# Patient Record
Sex: MALE | Race: WHITE
[De-identification: names, ages, dates, MRNs, and addresses within clinical notes are randomized per-mention and may not be internally consistent; named-entity substitution may affect disease eponyms.]

---

## 2018-06-13 ENCOUNTER — HOSPITAL ENCOUNTER (INPATIENT)
Dept: HOSPITAL 80 - FED | Age: 83
LOS: 5 days | Discharge: HOME | DRG: 687 | End: 2018-06-18
Attending: FAMILY MEDICINE | Admitting: FAMILY MEDICINE
Payer: COMMERCIAL

## 2018-06-13 DIAGNOSIS — E86.9: ICD-10-CM

## 2018-06-13 DIAGNOSIS — N40.1: ICD-10-CM

## 2018-06-13 DIAGNOSIS — K59.00: ICD-10-CM

## 2018-06-13 DIAGNOSIS — I10: ICD-10-CM

## 2018-06-13 DIAGNOSIS — R91.1: ICD-10-CM

## 2018-06-13 DIAGNOSIS — N17.9: ICD-10-CM

## 2018-06-13 DIAGNOSIS — K40.90: ICD-10-CM

## 2018-06-13 DIAGNOSIS — N13.39: ICD-10-CM

## 2018-06-13 DIAGNOSIS — D41.4: Primary | ICD-10-CM

## 2018-06-13 DIAGNOSIS — Z66: ICD-10-CM

## 2018-06-13 LAB — PLATELET # BLD: 166 10^3/UL (ref 150–400)

## 2018-06-13 PROCEDURE — G0103 PSA SCREENING: HCPCS

## 2018-06-13 PROCEDURE — A9503 TC99M MEDRONATE: HCPCS

## 2018-06-13 NOTE — EDPHY
H & P


Stated Complaint: BACK PAIN/POS PROSTATE INFX


Time Seen by Provider: 06/13/18 22:49


HPI/ROS: 


HPI:  This 84-year-old male who presented with





Chief Complaint:  Low back pain left lower quadrant pain





Location:  Low back, left lower quadrant


Quality:  Pain


Duration:  Several days


Signs and Symptoms: no fever, no nausea, no vomiting, no hematemesis, no blood 

in stool, no abdominal bloating, no diarrhea, no back pain, no urinary symptoms

, no testicular/groin pain, no indigestion, no chest pain, no shortness of 

breath


Timing:  Acute


Severity:  Worsening


Context:  Patient reports that he was having dysuria, urinary frequency, low 

back pain for several days and to urgent care [id number] and was diagnosed with BPH 

and acute prostatitis; he was given Flomax and Bactrim for which she has been 

taking and being compliant.  Over the last several days patient has continued 

to feel generalized weakness with fatigue.  His daughter is extremely concerned 

that he has diverticulitis and with like laboratory studies and CT abdomen and 

pelvis scan performed.  Patient reports that he has a decreased appetite.  

Denies any fever/diarrhea/nausea/vomiting. 


Modifying Factors:  Bactrim, Flomax





Comment: 








ROS: see HPI


Constitutional: No fever, no chills, no weight loss


Eyes:  No blurred vision


Respiratory:  No shortness of breath, no cough


Cardiovascular:  No chest pain, no palpitations


Gastrointestinal:  No nausea, no vomiting, no diarrhea, no hematemesis, no 

blood in stool 


Genitourinary:  No dysuria, no blood in urine 


Extremities:  No myalgias, no edema


Neurologic:  No weakness, no numbness


Skin:  No rashes, no petechiae


Hematologic:  No bruising, no bleeding








MEDICAL/SURGICAL/SOCIAL HISTORY: 


Medical history:  Hard of hearing on right; BPH. 


Surgical history:  Tonsillectomy


Social history:  Retired.  Never smoked.  Family history noncontributory.








CONSTITUTIONAL:  Polite and cooperative elderly white male, awake and alert, no 

obvious distress


HEENT: Atraumatic and normocephalic, PERRL, EOMI.  Nares patent; no rhinorrhea;

  no nasal mucosal edema. Tympanic membranes clear. Oropharynx clear, no 

exudate and moist pink mucosa.  Airway patent.  No lymphadenopathy.  No 

meningismus.


Cardiovascular: Normal S1/S2, regular rate, regular rhythm, without murmur rub 

or gallop.


PULMONARY/CHEST:  Symmetrical and nontender. Clear to auscultation bilaterally. 

Good air movement. No accessory muscle usage.


ABDOMEN:  Soft, nondistended, minimal left lower quadrant tenderness, no rebound

, no guarding, no peritoneal signs, no masses or organomegaly. No CVAT.


EXTREMITIES:  2/2 pulses, strength 5/5, no deformities, no clubbing, no 

cyanosis or edema.


NEUROLOGICAL: no focal neuro deficits.  GCS 15.


SKIN: Warm and dry, no erythema. no rash.  Good capillary refill. 








Source: Patient, Family (Daughter)


Exam Limitations: No limitations





- Personal History


Current Tetanus Diphtheria and Acellular Pertussis (TDAP): Yes


Tetanus Vaccine Date: 9/11





- Medical/Surgical History


Hx Asthma: No


Hx Chronic Respiratory Disease: No


Hx Diabetes: No


Hx Cardiac Disease: No


Hx Renal Disease: No


Hx Cirrhosis: No


Hx Alcoholism: No


Hx HIV/AIDS: No


Hx Splenectomy or Spleen Trauma: No


Other PMH: TONSIL, ADENOIDS, EYE STRAIGHTENING





- Social History


Smoking Status: Never smoked


Constitutional: 


 Initial Vital Signs











Temperature (C)  36.6 C   06/13/18 22:42


 


Heart Rate  71   06/13/18 22:42


 


Respiratory Rate  16   06/13/18 22:42


 


Blood Pressure  172/86 H  06/13/18 22:42


 


O2 Sat (%)  92   06/13/18 22:42








 











O2 Delivery Mode               Room Air














Allergies/Adverse Reactions: 


 





No Known Allergies Allergy (Verified 06/13/18 22:41)


 








Home Medications: 














 Medication  Instructions  Recorded


 


NK [No Known Home Meds]  06/13/18














Medical Decision Making


ED Course/Re-evaluation: 


Vital signs reviewed and stable.  No systemic signs.


Labs, urinalysis, CT abdomen and pelvis scan to evaluate for diverticulitis 

ordered. 


2315:  Labs reviewed.  Creatinine 2.9; BUN 42; in 2012 creatinine was around 1. 

1 L normal saline ordered. 


CT abdomen and pelvis scan changed to without contrast and differential now 

includes ureterolithiasis. 


Called by radiologist who advised that CT abdomen and pelvis scan shows 

significant bladder wall thickening concerning for malignancy as well as right 

ureteral blockage in obstructing in the collecting system


2354: ED decision to consult hospitalist for admission acute kidney injury 

secondary to obstruction verses Bactrim induced and bladder malignancy.  Spoke 

with Dr. Pepper who kindly agrees to admit patient for further care.  Urology 

consulted. 











This patient was seen under the supervision of my secondary supervising 

physician.  I evaluated care for this patient independently.  Discussed this 

patient with Dr. Moctezuma who did not see the patient.  








Differential Diagnosis: 


Back pain including but not limited to muscular pain, herniated disc, spine 

fracture, intra-abdominal causes and urinary tract infection.








- Data Points


Laboratory Results: 


 Laboratory Results





 06/13/18 23:07 





 06/13/18 23:07 





 











  06/13/18 06/13/18 06/13/18





  23:10 23:07 23:07


 


WBC      9.62 10^3/uL H 10^3/uL





     (3.80-9.50) 


 


RBC      4.53 10^6/uL 10^6/uL





     (4.40-6.38) 


 


Hgb      14.7 g/dL g/dL





     (13.7-17.5) 


 


POC Hgb  15.3 gm/dL gm/dL    





   (13.7-17.5)   


 


Hct      42.9 % %





     (40.0-51.0) 


 


POC Hct  45 % %    





   (40-51)   


 


MCV      94.7 fL fL





     (81.5-99.8) 


 


MCH      32.5 pg pg





     (27.9-34.1) 


 


MCHC      34.3 g/dL g/dL





     (32.4-36.7) 


 


RDW      13.3 % %





     (11.5-15.2) 


 


Plt Count      166 10^3/uL 10^3/uL





     (150-400) 


 


MPV      10.1 fL fL





     (8.7-11.7) 


 


Neut % (Auto)      76.8 % H %





     (39.3-74.2) 


 


Lymph % (Auto)      8.1 % L %





     (15.0-45.0) 


 


Mono % (Auto)      14.2 % H %





     (4.5-13.0) 


 


Eos % (Auto)      0.4 % L %





     (0.6-7.6) 


 


Baso % (Auto)      0.3 % %





     (0.3-1.7) 


 


Nucleat RBC Rel Count      0.0 % %





     (0.0-0.2) 


 


Absolute Neuts (auto)      7.38 10^3/uL H 10^3/uL





     (1.70-6.50) 


 


Absolute Lymphs (auto)      0.78 10^3/uL L 10^3/uL





     (1.00-3.00) 


 


Absolute Monos (auto)      1.37 10^3/uL H 10^3/uL





     (0.30-0.80) 


 


Absolute Eos (auto)      0.04 10^3/uL 10^3/uL





     (0.03-0.40) 


 


Absolute Basos (auto)      0.03 10^3/uL 10^3/uL





     (0.02-0.10) 


 


Absolute Nucleated RBC      0.00 10^3/uL 10^3/uL





     (0-0.01) 


 


Immature Gran %      0.2 % %





     (0.0-1.1) 


 


Immature Gran #      0.02 10^3/uL 10^3/uL





     (0.00-0.10) 


 


POC Sodium  138 mEq/L mEq/L    





   (135-145)   


 


Sodium    140 mEq/L mEq/L  





    (135-145)  


 


POC Potassium  4.9 mEq/L mEq/L    





   (3.3-5.0)   


 


Potassium    4.9 mEq/L mEq/L  





    (3.3-5.0)  


 


POC Chloride  112 mEq/L H mEq/L    





   ()   


 


Chloride    110 mEq/L mEq/L  





    ()  


 


Carbon Dioxide    16 mEq/l L mEq/l  





    (22-31)  


 


Anion Gap    14 mEq/L mEq/L  





    (8-16)  


 


POC BUN  45 mg/dL H mg/dL    





   (7-23)   


 


BUN    42 mg/dL H mg/dL  





    (7-23)  


 


Creatinine    2.5 mg/dL H mg/dL  





    (0.7-1.3)  


 


POC Creatinine  2.9 mg/dL H mg/dL    





   (0.7-1.3)   


 


Estimated GFR    25   





    


 


Glucose    119 mg/dL H mg/dL  





    ()  


 


POC Glucose  126 mg/dL H mg/dL    





   ()   


 


Calcium    9.0 mg/dL mg/dL  





    (8.5-10.4)  


 


Total Bilirubin    0.4 mg/dL mg/dL  





    (0.1-1.4)  


 


Conjugated Bilirubin    0.4 mg/dL mg/dL  





    (0.0-0.5)  


 


Unconjugated Bilirubin    0.0 mg/dL mg/dL  





    (0.0-1.1)  


 


AST    28 IU/L IU/L  





    (17-59)  


 


ALT    37 IU/L IU/L  





    (21-72)  


 


Alkaline Phosphatase    92 IU/L IU/L  





    ()  


 


Total Protein    6.1 g/dL L g/dL  





    (6.3-8.2)  


 


Albumin    3.0 g/dL L g/dL  





    (3.5-5.0)  


 


Lipase    47 IU/L IU/L  





    ()  











Medications Given: 


 








Discontinued Medications





Sodium Chloride (Ns)  1,000 mls @ 0 mls/hr IV EDNOW ONE; Wide Open


   PRN Reason: Protocol


   Stop: 06/13/18 23:17


   Last Admin: 06/13/18 23:16 Dose:  1,000 mls





Point of Care Test Results: 


 Chemistry











  06/13/18





  23:10


 


POC Sodium  138 mEq/L mEq/L





   (135-145) 


 


POC Potassium  4.9 mEq/L mEq/L





   (3.3-5.0) 


 


POC Chloride  112 mEq/L H mEq/L





   () 


 


POC BUN  45 mg/dL H mg/dL





   (7-23) 


 


POC Creatinine  2.9 mg/dL H mg/dL





   (0.7-1.3) 


 


POC Glucose  126 mg/dL H mg/dL





   () 








 ISTAT H&H











  06/13/18





  23:10


 


POC Hgb  15.3 gm/dL gm/dL





   (13.7-17.5) 


 


POC Hct  45 % %





   (40-51) 














Departure





- Departure


Disposition: [facility] Inpatient Acute


Clinical Impression: 


 AMANDA (acute kidney injury), Obstruction of right ureter





Bladder malignancy


Qualifiers:


 Bladder location: unspecified site Qualified Code(s): C67.9 - Malignant 

neoplasm of bladder, unspecified





Condition: Fair

## 2018-06-14 LAB — PLATELET # BLD: 153 10^3/UL (ref 150–400)

## 2018-06-14 RX ADMIN — DOCUSATE SODIUM AND SENNOSIDES SCH TAB: 50; 8.6 TABLET ORAL at 20:52

## 2018-06-14 RX ADMIN — SODIUM CHLORIDE SCH MLS: 900 INJECTION, SOLUTION INTRAVENOUS at 10:29

## 2018-06-14 RX ADMIN — DOCUSATE SODIUM AND SENNOSIDES SCH TAB: 50; 8.6 TABLET ORAL at 09:43

## 2018-06-14 RX ADMIN — HYDROCODONE BITARTRATE AND ACETAMINOPHEN PRN TAB: 5; 325 TABLET ORAL at 18:47

## 2018-06-14 RX ADMIN — TAMSULOSIN HYDROCHLORIDE SCH MG: 0.4 CAPSULE ORAL at 09:43

## 2018-06-14 RX ADMIN — TRAVOPROST SCH DROP: 0.04 SOLUTION/ DROPS OPHTHALMIC at 20:52

## 2018-06-14 RX ADMIN — HYDROCODONE BITARTRATE AND ACETAMINOPHEN PRN TAB: 5; 325 TABLET ORAL at 00:40

## 2018-06-14 RX ADMIN — HYDROCODONE BITARTRATE AND ACETAMINOPHEN PRN TAB: 5; 325 TABLET ORAL at 06:18

## 2018-06-14 NOTE — GCON
[f rep st]



                                                                    CONSULTATION





DATE OF CONSULTATION:  2018



REASON FOR CONSULT:  Renal mass, question bladder mass.



HPI:  This is a pleasant 84-year-old male, who presented with right lower back pain that has been occ
urring for the last week.  He is also reporting feeling lack of energy and dizzy.  He lives alone in 
[facility] and came down to visit his daughter, who, upon seeing how her father was feeling, recommended
 that she bring him into the emergency room.  He is also now having a new symptom of shortness of judi
ath that occurs while at rest, which he denies having had in the past.  He does have a long history o
f urinary issues for the last at least a couple of years.  He reports getting up 3 times at night wit
h a low flow of urine.  Denies a known history of prostate cancer or kidney cancer.  Did have hematur
ia once several months ago.  Initially went to [facility] on 2018, before going to the
 ER.  Was diagnosed with prostatitis and started on antibiotics.  Yet, urine culture is negative at t
hat visit.



HOME MEDICATIONS:  As per EMR.



ALLERGIES:  No known drug allergies.



PAST MEDICAL HISTORY:  Macular degeneration, BPH, hearing deficit, lower GI bleeding, diverticulosis,
 internal hemorrhoids.



SURGICAL:  Tonsillectomy, adenoidectomy, EGD and colonoscopy in 2018.



FAMILY HISTORY:  Father is  from coronary artery disease.  The patient has 4 adult children, 
including his daughter, who lives in [place], is at the bedside.



SOCIAL HISTORY:  Lives alone in [facility] [place] [facility] and is healthy.  Recently traveled to [place].  Denies smok
ing.  Occasional beer drinking.



PHYSICAL EXAM:  VITAL SIGNS:  Blood pressure is 161/73.  Heart rate is 58, respiratory rate 16, tempe
rature 36.7.  O2 is 93 on room air.  GENERAL:  This is a well-developed, well-nourished male in no ac
[place] distress.  HEENT:  Normocephalic, atraumatic.  Extraocular movements intact.  NECK:  Supple.  No 
lymphadenopathy.  CARDIAC:  Regular rate and rhythm.  No lower extremity edema.  No obvious JVD.  RES
PIRATORY:  Normal breath sounds without accessory respiratory muscle use.  GI:  Abdomen was soft, non
distended, nontender to palpation.  :  No CVA tenderness.  No bladder distention or tenderness to p
alpation.  INTEGUMENT:  No obvious rashes or lesions.  MUSCULOSKELETAL:  Patient was examined while s
upine.  NEURO:  He is alert and oriented.  Affect appropriate to situation.



LABS:  His white blood cell count is 8.86, hemoglobin 13.3, hematocrit 39.3, platelets 153.  His chem
istry:  Sodium 140, potassium 4.8, chloride 112, carbon dioxide 17, BUN 38, creatinine 2.4, glucose 8
8.  PSA pending.  Urine:  Positive for 1+ leuks only.  CAT scan of the abdomen and pelvis was reviewe
d personally by myself, along with Dr. Diamond.  Does show a question of a possible mass versus enlarge
d bladder versus fluid in the bladder, along with thickened-appearing bladder wall.  Does have multip
le nodules on the left kidney, by our read more concerning for inflammatory process on the right kidn
ey, especially without clear evidence of an infection.



ASSESSMENT/PLAN:  Back pain of unknown etiology, question bladder mass, inflammatory process of the r
ight kidney, mass of the left kidney.  After review of this complicated case with Dr. Diamond, we will 
order a CT cystogram to better assess the bladder.  MRI of the kidneys has been ordered, along with a
 PSA.  Once imaging and labs have returned, we will be better able to move forward in caring for this
 patient.





Job #:  [id number]

## 2018-06-14 NOTE — PDMN
Medical Necessity


Medical necessity: Pt meets IP criteria per MD & MCG CCC-039: Urinary 

Complications w/bladder wall mass/high-grade obstructive process suggestive of 

malignancy, worsening back pain, generalized weakness/fatigue, acute renal 

failure & dyspnea; admit for further workup, Urology consult, IV abx & IVFs

## 2018-06-14 NOTE — HOSPPROG
Hospitalist Progress Note


Assessment/Plan: 





#Bladder Mass


#Low back pain and LLQ Pain


#High grade obstruction of renal collecting system due to bladder mass


#Bilateral renal and parapelvic nodules


#RLL 2 cm nodule


#AMANDA





Plan:


IVF


Urology to see


Additional imaging per Urology


Would like to order CT chest, abd/pelvis with IV contrast once Cr. is improving


Stop Rocephin


cont inpatient


Objective: 


 Vital Signs











Temp Pulse Resp BP Pulse Ox


 


 36.7 C   58 L  16   161/73 H  93 


 


 06/14/18 12:42  06/14/18 12:42  06/14/18 12:42  06/14/18 12:56  06/14/18 12:42








 Laboratory Results





 06/14/18 06:10 





 06/14/18 06:10 





 











 06/13/18 06/14/18 06/15/18





 05:59 05:59 05:59


 


Intake Total   1000


 


Output Total   150


 


Balance   850














ICD10 Worksheet


Patient Problems: 


 Problems











Problem Status Onset


 


AMANDA (acute kidney injury) Acute  


 


Bladder malignancy Acute  


 


Obstruction of right ureter Acute  


 


Bleeding Active

## 2018-06-14 NOTE — ASMTCMCOM
CM Note

 

CM Note                       

Notes:

Chart reviewed. Patient is an 84 year old male from [facility]. He was brought in by his 

daughter Bethany when she became concerned about his having difficulty with breathing after having 

been diagnosed with prostatitis and been taking antibiotics He normally spends most of his time in 

[place] and is typically very active riding his bike to the store and into [place]. She shares 

with me she hopes to get him to come down to her home in [place] more often He lost his wife Barbie 

about 17 years ago and most recently his  moved to [place] to be closer to her 

family. He is awaiting testing at this time. Needs TBD.



Plan: TBD

 

Date Signed:  06/14/2018 02:11 PM

Electronically Signed By:Porsha [place] RN

## 2018-06-14 NOTE — GHP
[f 
rep st]



                                                            HISTORY AND PHYSICAL





DATE OF ADMISSION:  2018



SOURCE OF DATA:  Patient provides history, appears reliable.  EMR was reviewed 
and case discussed with ED provider.



PRIMARY CARE PHYSICIAN:  Unlisted.



CHIEF COMPLAINT:  Low back pain and left lower quadrant abdominal pain.



HISTORY OF PRESENT ILLNESS:  This is a very pleasant 84-year-old gentleman with 
past medical history significant for BPH, macular degeneration, hearing deficit
, and history of diverticulosis, who presents to the emergency department today 
with complaints of worsening low back pain and left lower quadrant abdominal 
pain for several days.  The patient reports that his low back pain actually 
started weeks to months ago.  He had an episode of hematuria at 1 point, which 
did clear, but this was several months ago.  He has a history of urinary 
frequency, nocturia and difficulty initiating urinary stream.  He has not had a 
formal diagnosis of BPH.  However, he did present to Urgent Care a few days ago 
and was diagnosed with prostatitis and started on Flomax and Bactrim.  The 
patient since that time has developed worsening generalized weakness and 
increasing fatigue, decreased appetite.  He denies any fevers, chills, nausea, 
vomiting, or diarrhea.  He has not had any melena or hematochezia.  No known 
sick contacts.



REVIEW OF SYSTEMS:  GENERAL:  Negative for fevers, chills.  SKIN:  No acute 
rashes, sores.  ENT:  Patient reports some disequilibrium with ambulation 
occasionally related to his progressively declining hearing, right worse than 
left ear.  He denies any rhinorrhea or sore throat.  EYES:  Patient wears 
glasses.  No acute vision changes, but has progressively had decline related to 
his macular degeneration.  CV:  No chest pain, palpitations.  RESPIRATORY:  
Patient does report that he has been having increasing shortness of breath with 
exertion in the last several weeks.  He denies any cough.  He also notes that 
he has had some issues with adjustment when he would go back home to [place] related to altitude at 8700 feet.  However, in the last several weeks, he 
reports this is more unusual than normal.  He denies any lower extremity 
swelling or edema.  No calf pain or tenderness.  GI:  See HPI.  :  The 
patient denies any dysuria, but he has had plenty of frequency with decreased 
stream and difficulties initiating a stream.  He has had a singular episode of 
hematuria that resolved several months ago.  No tobacco history. 

MUSCULOSKELETAL:  The patient denies any acute joint pain or myalgias.  NEURO:  
Patient does report occasional pressure-type headaches.  No numbness, tingling, 
or focal deficits.  PSYCH:  Negative for anxiety, depression. Remainder ROS 
negative except as noted above.



ALLERGIES:  No known drug allergies.



HOME MEDICATIONS:  As per EMR.  Just recently patient was started on Bactrim 
and Flomax.  Otherwise, none listed.



PAST MEDICAL HISTORY:  Significant for macular degeneration, BPH, hearing 
deficit, history of lower GI bleeding diagnosed with diverticulosis, internal 
hemorrhoids.



PAST SURGICAL HISTORY:  Significant for tonsillectomy, adenoidectomy, EGD and 
colonoscopy in 2018.



FAMILY HISTORY:  Father , related to CAD.  Mother passed away at old 
age of 98.  The patient has 4 adult children who are mostly healthy.  He has 1 
son with a history of prostate cancer.  No colon cancer runs in the family.



SOCIAL HISTORY:  Patient lives alone currently.  He is .  He previously 
had a  living with him, but she has since moved out of state.  He has 
good support from his children in town.  He does not smoke.  He drinks an 
occasional beer.  Nothing on a daily basis and does not use any illicit drugs 
or marijuana.



CODE STATUS:  Patient with advanced directives and is a DNR DNI.



PHYSICAL EXAM:  VITAL SIGNS:  Blood pressure 172/86, heart rate 71, respiratory 
rate 16, O2 saturation 92% on room air with temperature 36.6.  GENERAL:  No 
acute distress.  Pleasant, elderly adult gentleman is resting quietly in bed.  
He wakes easily to his name.  Pleasant and cooperative.  HEAD:  Normocephalic, 
atraumatic.  EYES:  Extraocular muscles grossly intact.  Pupils equal, round, 
decreased reactive to light bilaterally, but symmetric.  No scleral icterus or 
conjunctival injection.  ENT:  Mucous membranes appear slightly dry.  No 
oropharyngeal erythema or exudates.  Dentition is fair condition.  NECK:  
Supple.  Trachea midline.  CV:  Regular rate and rhythm.  No murmurs, rubs, or 
gallops appreciated.  RESPIRATORY:  Unlabored breathing.  Lungs are clear to 
auscultation bilaterally, but he is a bit diminished bibasilarly, possibly a 
few crackles in the left base compared to the right.  Otherwise, unlabored 
breathing.  ABDOMEN:  Positive bowel sounds.  Soft, nontender to palpation.  No 
rebound, guarding, or masses appreciated.  :  No Maxwell catheter in place.  No 
suprapubic tenderness to palpation.  EXTREMITIES:  2+ pedal pulses bilaterally 
and symmetric. No edema.  Patient sits up independently.  Moves all 
extremities.  Strength grossly intact. 5/5 upper and lower extremities.  NEURO:
  Grossly nonfocal.  No facial drooping.  Moves all extremities as noted above.
  PSYCH:  Thought process, content and questions are all appropriate.  Patient 
awake, alert, and oriented x3.



LABORATORY STUDIES:  

1.  WBC is 9.62, H and H is 14.7, 42.9, MCV of 94.7, platelet count is 166, 
neutrophil percent 76.8, no bands.

2.  Sodium is 140, potassium 4.9, chloride 110, CO2 16, BUN is 42, creatinine 
is 2.5, GFR estimate is 25, glucose 119, calcium is 9.2.  Total protein 6.1, 
albumin is 3.0, total bilirubin 0.4, ALT is 37, AST 28, alkaline phosphatase 92
, lipase is 47.  UA:  Specific gravity 1.019, pH of 5.0, 2+ protein, 1+ leuk 
esterase.  RBCs and WBCs are pending.  Glucose negative.

3.  CT abdomen and pelvis:  Image report was reviewed myself, is noncontrast 
study.  Visualized lung bases show no discrete consolidation or effusion.  
Pulmonary nodule, normal size liver with smooth surface contour, 6 mm 
hypoattenuating nodule within the inferior left hepatic lobe, probably a simple 
cyst.  Gallbladder decompressed.  No ductal dilation.  There is a high-grade 
obstruction of the right renal collecting system with inflammatory stranding 
throughout the renal hilum.  Obstruction likely related to irregular masslike 
thickening of the right bladder wall.  There is also pathologic enlargement of 
the adjacent lymph nodes in the internal iliac chain, representative of large 
lymph node measuring up to 1.9 cm.  Numerous bilateral renal cortical nodules 
suggestive of simple and hypoattenuated cysts, limited secondary to noncontrast 
study.  Hiatal hernia present is small.  Small bowel is nondilated, non 
thickened, large right inguinal hernia contains nonobstructed loops of distal 
small bowel.  There is dilation of the cecum with retained stool and gas.  
Extensive diverticulosis is noted.  Prostate enlarged with dystrophic internal 
calcifications.  No intraabdominal free fluid or free air.  Multiple level 
degenerative changes in the lumbar spine are present.

4.  Chest x-ray ordered.  Image report reviewed myself.  Appears to be some 
linear atelectasis, upper lobe haziness, perihilar prominence is present.



ASSESSMENT AND PLAN:  This is a pleasant 84-year-old gentleman who presents 
with complaints of low back pain and lower abdominal pain.

1.  Abdominal pain.  CT does not quite explain the patient's complaint of right-
sided abdominal pain.  He does have a high-grade obstructive process on the 
left renal collecting system with findings suggestive of malignancy.  He also 
has a right-sided inguinal hernia which is relatively asymptomatic.  At this 
time, patient reports that his pain is currently well controlled.  CT findings 
were discussed with the patient previously by the ED provider.  I did review 
again with the patient the findings.  Plan for urology consultation has been 
called from the emergency department to see the patient tomorrow.  The patient'
s UA is still pending at this time.  Given patient's high-grade obstructive 
process, we will plan to add on dosing of Rocephin.  He has been on Bactrim.  
However, given patient's acute renal insufficiency, we will discontinue.  We 
will plan to continue patient's Flomax.

2.  Bladder wall mass.  Plan as above.  

3.  Acute renal failure.  Patient's most recent renal function dates back to 
 and was 1.08. Likely some component of obstructive nephropathy given 
findings of bladder wall mass and high-grade obstruction on the right as well 
as patient's history of benign prostatic hypertrophy.  Continue with some 
gentle IV fluid hydration.  Continue with Flomax.  Neurology consultation as 
noted above.  Repeat BMP in the morning.

4.  Dyspnea.  Chest x-ray obtained.  No evidence of acute consolidations.  
There are some chronic appearing findings in the upper lobe.  Given patient's 
new finding of bladder wall mass at increased risk for pulmonary embolus, 
patient not currently tachycardic or hypoxic.  Consider additional imaging if 
patient's renal function improves.  Hold off on anticoagulation at this time 
pending Urology evaluation.

5.  Benign prostatic hypertrophy.  Continue Flomax.  Monitor for urinary 
retention.

6.  Hypoalbuminemia, likely declined in setting of newfound findings of bladder 
wall mass and acute process.

7.  Constipation.  Bowel protocol will be ordered.  

8.  Fluid, electrolyte and nutrition, IV fluids overnight for hydration. Will 
make patient be n.p.o., electrolyte monitoring, replacement if needed.

9.  Prophylaxis.  Sequential compression devices, holding anticoagulation 
pending urologic evaluation.

10.  Code status is DNR DNI.  Patient reports that he has advanced directives 
in place.

11.  Disposition.  The patient has been admitted to inpatient status.  
Anticipate that the patient will be here greater than 2 midnights given the 
complexity of his findings and need for additional workup.





Job #:  [id number]

MTDD

## 2018-06-15 LAB
CK SERPL-CCNC: 25 IU/L (ref 0–224)
PLATELET # BLD: 162 10^3/UL (ref 150–400)

## 2018-06-15 RX ADMIN — DOCUSATE SODIUM AND SENNOSIDES SCH: 50; 8.6 TABLET ORAL at 21:22

## 2018-06-15 RX ADMIN — HYDROCODONE BITARTRATE AND ACETAMINOPHEN PRN TAB: 5; 325 TABLET ORAL at 01:06

## 2018-06-15 RX ADMIN — HYDROCODONE BITARTRATE AND ACETAMINOPHEN PRN TAB: 5; 325 TABLET ORAL at 09:15

## 2018-06-15 RX ADMIN — TAMSULOSIN HYDROCHLORIDE SCH MG: 0.4 CAPSULE ORAL at 09:15

## 2018-06-15 RX ADMIN — TRAVOPROST SCH DROP: 0.04 SOLUTION/ DROPS OPHTHALMIC at 21:36

## 2018-06-15 RX ADMIN — SODIUM CHLORIDE SCH MLS: 900 INJECTION, SOLUTION INTRAVENOUS at 10:02

## 2018-06-15 RX ADMIN — DOCUSATE SODIUM AND SENNOSIDES SCH TAB: 50; 8.6 TABLET ORAL at 09:15

## 2018-06-15 NOTE — ASMTCMCOM
CM Note

 

CM Note                       

Notes:

Chart reviewed. Still awaiting diagnostic results. Dr. Diamond from urology in to see the 

patient. The patient and his daughter have ambulated in the halls. His gait is steady, No therapies 


currently ordered. CM to follow as needs are to be determined.



Plan: TBD

 

Date Signed:  06/15/2018 01:03 PM

Electronically Signed By:Porsha Montana RN

## 2018-06-15 NOTE — SOAPPROG
SOAP Progress Note


Assessment/Plan: 


Assessment: Bladder malignancy   Acute  CT cystogram concerning for prostate 

cancer and possible TCC of the bladder, Await PSA prior to further assessment


   


                   Obstruction of right ureter   Acute no dilation of ureter 

and good parenchyma, wait on PSA prior to further recommendations











Plan: as noted





06/15/18 11:27





Subjective: 





stable


Objective: 





 Vital Signs











Temp Pulse Resp BP Pulse Ox


 


 36.9 C   66   18   168/81 H  92 


 


 06/15/18 08:26  06/15/18 10:43  06/15/18 09:19  06/15/18 10:43  06/15/18 10:43








 Laboratory Results





 06/15/18 04:26 





 06/15/18 04:26 





 











 06/14/18 06/15/18 06/16/18





 05:59 05:59 05:59


 


Intake Total  2422 


 


Output Total  1275 


 


Balance  1147 














Physical Exam





- Physical Exam


General Appearance: no apparent distress





ICD10 Worksheet


Patient Problems: 


 Problems











Problem Status Onset


 


AMANDA (acute kidney injury) Acute  


 


Bladder malignancy Acute  


 


Obstruction of right ureter Acute  


 


Bleeding Active

## 2018-06-15 NOTE — HOSPPROG
Hospitalist Progress Note


Assessment/Plan: 





#Bladder Mass


#Low back pain and LLQ Pain


#High grade obstruction of renal collecting system due to bladder mass


#Bilateral renal and parapelvic nodules


#RLL 2 cm nodule


#AMANDA, vs acute on chronic, vs chronic


#constipation


-KUB reviewed





Plan:


IVF- cont


urine studies


no abx


cont angel


await PSA


Urology following


Would like to order CT chest, abd/pelvis with IV contrast once Cr. is improving


cont inpatient


Subjective: abd distention. some SOB


Objective: 


 Vital Signs











Temp Pulse Resp BP Pulse Ox


 


 36.9 C   61   16   170/78 H  91 L


 


 06/15/18 12:43  06/15/18 12:43  06/15/18 12:43  06/15/18 12:43  06/15/18 12:43








 Laboratory Results





 06/15/18 04:26 





 06/15/18 13:38 





 











 06/14/18 06/15/18 06/16/18





 05:59 05:59 05:59


 


Intake Total  2422 


 


Output Total  1275 


 


Balance  1147 














- Physical Exam


Constitutional: no apparent distress, not in pain


Eyes: PERRL, EOMI


Ears, Nose, Mouth, Throat: moist mucous membranes, hearing normal, ears appear 

normal


Cardiovascular: regular rate and rhythym, No edema


Respiratory: no respiratory distress, no rales or rhonchi


Gastrointestinal: distension


Skin: warm


Musculoskeletal: No generalized weakness


Neurologic: AAOx3


Psychiatric: interacting appropriately, not anxious


Lymph, Heme, Immunologic: no cervical LAD, No petechiae





ICD10 Worksheet


Patient Problems: 


 Problems











Problem Status Onset


 


AMANDA (acute kidney injury) Acute  


 


Bladder malignancy Acute  


 


Obstruction of right ureter Acute  


 


Bleeding Active

## 2018-06-15 NOTE — GCON
[f rep st]



                                                                    CONSULTATION





ONCOLOGY CONSULT



REASON FOR CONSULTATION:  Bladder mass.



HISTORY OF PRESENT ILLNESS:  Much of the history was obtained from both the patient, the chart, and humza macedo his daughter, Bethany, who is very up to date about his health care.  He reports that he has been h
aving increasing trouble with urination.  He has also had some abnormal discomfort and sensation for 
the last 3 or 4 months down in his pelvis and genital area.  After he went to the bathroom a couple d
ays ago, he did have some severe pain, but then it seemed to settle down with some Advil.  The next d
ay, the pain kept coming back, and he saw his PCP.  I believe the UA at that time was unremarkable.  
On Monday, his daughter notes that he seemed more fatigued, and with the urinary problem she took him
 to Urgent Care.  They gave him antibiotics and Flomax for possible prostatitis.  Since that time, ivone preston generalized weakness and fatigue worsened, and he has had decreased appetite.  He has had some mild
 dyspnea with exertion.  He denied any bleeding or significant bruising.  Denied any fever or chills.
  He was brought to the emergency room for the persistent low-back pain and lower quadrant abdominal 
pain where a CT scan revealed a possible high-grade obstruction of the right renal collecting system 
with stranding in the hilum, and an irregular mass-like thickening in the right bladder wall, and deb
e enlargement in the surrounding lymph nodes, and enlarged prostate.  He has been seen by Urology, bu
t has not had a biopsy yet.  I reviewed the films with Dr. Diamond.



ALLERGIES:  He has no known drug allergies.



HOME MEDICATIONS:  Include eye drops; travoprost.  He also takes ibuprofen.  He was recently on Bactr
im for the prostatitis.  Tamsulosin and multivitamin.



PAST MEDICAL HISTORY:  Chronic illnesses really minimal.  He has had BPH, macular degeneration with t
rouble with his vision, chronic hearing loss, and diverticulosis.



SURGICAL HISTORY:  Includes eye surgery and tonsillectomy as a child.



FAMILY HISTORY:  Mother  age 98.  Father  of heart disease.  No history of cancer in either o
f them.  He had a sister with a history of breast cancer, but no other cancer in siblings.  He has 4 
children; 2 brothers, one recently diagnosed with prostate cancer, and 1 daughter.



SOCIAL HISTORY:  He is a .  He currently lives alone, but he has local children who support hi
m.  Does not smoke.  He drinks only an occasional beer and no drug use.  He typically is very active.




REVIEW OF SYSTEMS:  10-point review of systems performed.  Pertinent positives as per HPI, otherwise 
negative.



PHYSICAL EXAM:  VITAL SIGNS:  Temperature is 36.9, pulse is 61, blood pressure is 170/78.  General:  
He is an elderly man in no distress.  HEENT:  He has poor vision.  Sclerae nonicteric.  Oral mucosa i
s unremarkable.  He has decreased hearing, but not wearing his hearing aids right now.  LUNGS:  Clear
.  CARDIAC:  Regular.  ABDOMEN:  Soft, mildly distended.  Bowel sounds are present.  EXTREMITIES:  Un
remarkable. NEUROLOGICAL:  Appears to be grossly intact.  He does have a Maxwell catheter in, and he ha
s had 1100 mL out recently of urine.



LABS:  On arrival, white count is 9600, hemoglobin is normal, platelet count is normal.  Today, the C
BC is similar.  CMP:  On arrival, his BUN and creatinine are 42 and 2.5.  His other chemistries and L
FTs are unremarkable.  Albumin is down to 3.  Today, his BUN and creatinine are 32 and 2.0.  Potassiu
m is up a little bit, 5.3.  PSA is 3.08.



IMAGING:  Chest x-ray showed mild bibasilar atelectasis, some peribronchial wall thickening.  X-ray o
f the abdomen showed constipation.  CT of the abdomen and pelvis showed high-grade obstruction in the
 right renal collecting system; bilateral renal cortical and parapelvic nodules, most likely cysts; e
xtensive diverticulosis; large right internal hernia.  He has a bladder wall mass on the right side p
robably involving the distal right ureter with associated pelvic lymphadenopathy.  He also has an enl
arged prostate.  There is a small 6 mm nodule in the left lobe, probably a cyst.  Chest x-ray at the 
time showed a right lower lobe 2 cm rounded pneumonitis or pulmonary nodule.  Abdominal MRI showed ri
ght nephromegaly with right renal hilar inflammation and moderate-to-severe right hydronephrosis, lik
ely reflecting calyceal rupture in a patient with a bladder mass obstructing the right ureterovesical
 junction.  CT pelvis with cystogram showed right-sided bladder mass obstructing the ureterovesical j
unction and contiguous with an enlarged nodular prostate.  Could be an extension of prostate carcinom
a versus a bladder carcinoma, enlarged right pelvic lymph node.



IMPRESSION:  

1.  Enlarged prostate.

2.  Probable right bladder mass, unclear etiology.

3.  Right hydronephrosis.

4.  Vague constitutional symptoms.



PLAN:  At this point, with a normal PSA, locally invasive prostate cancer seems unlikely.  He still c
ould have BPH.  I spoke to Dr. Diamond, and his approach was going to be dependent upon that PSA, and I
 will defer to him how best to evaluate the bladder mass.  With it obstructing the right kidney and w
ith an associated lymph node, I am concerned about a localized malignancy.  If it turns out to be haley
dder cancer spread to the lymph nodes, he would not be a great candidate for standard chemotherapy, a
lthough he could probably tolerate immuno-oncology therapy.  At this point, main recommendation is ma
ybe check a sed rate to make sure he does not have evidence of PMR as well as a CPK to make sure he d
oes not have any signs of myositis, perhaps also having an aldolase.  In addition, since there is a q
uestion in his lung, we can get a CT without contrast.  We will follow along with you while in the 
spital, but defer further workup to Urology.





Job #:  [id number]

## 2018-06-16 RX ADMIN — HYDROCODONE BITARTRATE AND ACETAMINOPHEN PRN TAB: 5; 325 TABLET ORAL at 18:41

## 2018-06-16 RX ADMIN — ACETAMINOPHEN PRN MG: 325 TABLET ORAL at 14:09

## 2018-06-16 RX ADMIN — ACETAMINOPHEN PRN MG: 325 TABLET ORAL at 23:08

## 2018-06-16 RX ADMIN — HYDROCODONE BITARTRATE AND ACETAMINOPHEN PRN TAB: 5; 325 TABLET ORAL at 05:57

## 2018-06-16 RX ADMIN — DOCUSATE SODIUM AND SENNOSIDES SCH TAB: 50; 8.6 TABLET ORAL at 21:08

## 2018-06-16 RX ADMIN — TAMSULOSIN HYDROCHLORIDE SCH MG: 0.4 CAPSULE ORAL at 09:57

## 2018-06-16 RX ADMIN — DOCUSATE SODIUM AND SENNOSIDES SCH TAB: 50; 8.6 TABLET ORAL at 21:04

## 2018-06-16 RX ADMIN — TRAVOPROST SCH DROP: 0.04 SOLUTION/ DROPS OPHTHALMIC at 21:03

## 2018-06-16 RX ADMIN — SODIUM CHLORIDE SCH MLS: 900 INJECTION, SOLUTION INTRAVENOUS at 21:03

## 2018-06-16 RX ADMIN — SODIUM CHLORIDE SCH MLS: 900 INJECTION, SOLUTION INTRAVENOUS at 05:05

## 2018-06-16 RX ADMIN — DOCUSATE SODIUM AND SENNOSIDES SCH: 50; 8.6 TABLET ORAL at 09:57

## 2018-06-16 RX ADMIN — HYDROCODONE BITARTRATE AND ACETAMINOPHEN PRN TAB: 5; 325 TABLET ORAL at 21:08

## 2018-06-16 NOTE — SOAPPROG
SOAP Progress Note


Assessment/Plan: 


Assessment:


1. Bladder mass


2. R ureteral obstruction / hydronephrosis due to #1


3. ?calyceal rupture


4. Renal failure, likely due to obstruction, improving





Given that PSA is normal, likely dx is bladder cancer.  Neuroendocrine variant 

of prostate cancer or other malignancy cannot be excluded. 














Plan:


- d/w Dr. Meyers of urology. Pt ultimately needs a biopsy for diagnosis, but 

may not happen on the weekend. 


- ?ureteral stent or nephrostomy tube to treat R hydronephrosis? will defer to 

urology re: optimal management. Cr is improving


- continue gentle IV fluids


- further oncologic care to be determined by the diagnosis. evidence of 

regional adenopathy but no distant mets. will need bone scan at some point. 





35 min spent w/ pt and in coordination of care. 





 


06/16/18 09:05





Subjective: 





feels well. 


Objective: 


exam:


NAD


Lungs CTAB


CV RRR no MGR


Abd: +BS. distended. angel in place


Ext: 1+ edema


Neuro: a+ox3


 Vital Signs











Temp Pulse Resp BP Pulse Ox


 


 36.7 C   67   17   154/85 H  92 


 


 06/16/18 08:38  06/16/18 08:38  06/16/18 08:38  06/16/18 08:38  06/16/18 08:38








 Laboratory Results





 06/15/18 04:26 





 06/16/18 05:12 





 











 06/15/18 06/16/18 06/17/18





 05:59 05:59 05:59


 


Intake Total 2422  


 


Output Total 1275 1400 


 


Balance 1147 -1400 














ICD10 Worksheet


Patient Problems: 


 Problems











Problem Status Onset


 


AMANDA (acute kidney injury) Acute  


 


Bladder malignancy Acute  


 


Obstruction of right ureter Acute  


 


Bleeding Active

## 2018-06-16 NOTE — HOSPPROG
Hospitalist Progress Note


Assessment/Plan: 





#Bladder Mass causing right sided hydronephrosis


-PSA not elevated, making the diagnosis of Bladder cancer more probable 


#Low back pain and LLQ Pain


#High grade obstruction of renal collecting system due to bladder mass


#Bilateral renal and parapelvic nodules


#RLL 2 cm nodule


-no e/o of metastasis on CT w/o IV contrast


#AMANDA, vs acute on chronic, vs chronic


-Due to obstruction


-Improving


#constipation and abdominal distention


-KUB reviewed





Plan:


-IVF


-Urology following, will await surgical recc, biopsy reccs for diagnosis


-no abx


-cont angel


-CLD (no e/o of obstruction on imaging, but distended abd and decreased BS). 


-Avoid narcotics if possible as likely contributing to decreased gut motility








Subjective: no cp or sob. still with abd distention but improving. had large BM 

last night.


Objective: 


 Vital Signs











Temp Pulse Resp BP Pulse Ox


 


 36.7 C   67   17   154/85 H  94 


 


 06/16/18 08:38  06/16/18 08:38  06/16/18 08:38  06/16/18 08:38  06/16/18 10:31








 Laboratory Results





 06/15/18 04:26 





 06/16/18 05:12 





 











 06/15/18 06/16/18 06/17/18





 05:59 05:59 05:59


 


Intake Total 2422  


 


Output Total 1275 1400 


 


Balance 1147 -1400 














- Physical Exam


Constitutional: no apparent distress


Eyes: PERRL, EOMI


Ears, Nose, Mouth, Throat: moist mucous membranes


Cardiovascular: regular rate and rhythym


Respiratory: no respiratory distress


Gastrointestinal: distension, No normoactive bowel sounds


Skin: warm


Musculoskeletal: generalized weakness


Neurologic: AAOx3


Psychiatric: interacting appropriately, not anxious


Lymph, Heme, Immunologic: No petechiae





ICD10 Worksheet


Patient Problems: 


 Problems











Problem Status Onset


 


AMANDA (acute kidney injury) Acute  


 


Bladder malignancy Acute  


 


Obstruction of right ureter Acute  


 


Bleeding Active

## 2018-06-16 NOTE — SOAPPROG
SOAP Progress Note


Assessment/Plan: 


Assessment: Bladder malignancy   Acute  CT cystogram concerning for prostate 

cancer and possible TCC of the bladder, normal PSA  and BPH is considered in 

his LUT assessment, discussed and consider TURBT with understanding this may 

not be successful based on mass effect


   


                   Obstruction of right ureter   Acute no dilation of ureter 

and good parenchyma, discussed options of PCN, attempt to place stent or 

retrograde yet doubtful can be done











Plan: as noted











06/16/18 12:20





Subjective: 





improving


Objective: 





 Vital Signs











Temp Pulse Resp BP Pulse Ox


 


 36.7 C   67   17   154/85 H  94 


 


 06/16/18 08:38  06/16/18 08:38  06/16/18 08:38  06/16/18 08:38  06/16/18 10:31








 Laboratory Results





 06/15/18 04:26 





 06/16/18 05:12 





 











 06/15/18 06/16/18 06/17/18





 05:59 05:59 05:59


 


Intake Total 2422  


 


Output Total 1275 1400 


 


Balance 1147 -1400 














Physical Exam





- Physical Exam


General Appearance: alert


Respiratory: No respiratory distress


Abdomen: soft


Neuro/Psych: alert, oriented x 3





ICD10 Worksheet


Patient Problems: 


 Problems











Problem Status Onset


 


AMANDA (acute kidney injury) Acute  


 


Bladder malignancy Acute  


 


Obstruction of right ureter Acute  


 


Bleeding Active

## 2018-06-17 RX ADMIN — DOCUSATE SODIUM AND SENNOSIDES SCH TAB: 50; 8.6 TABLET ORAL at 21:07

## 2018-06-17 RX ADMIN — DOCUSATE SODIUM AND SENNOSIDES SCH TAB: 50; 8.6 TABLET ORAL at 08:54

## 2018-06-17 RX ADMIN — HYDROCODONE BITARTRATE AND ACETAMINOPHEN PRN TAB: 5; 325 TABLET ORAL at 01:56

## 2018-06-17 RX ADMIN — TAMSULOSIN HYDROCHLORIDE SCH MG: 0.4 CAPSULE ORAL at 08:54

## 2018-06-17 RX ADMIN — ACETAMINOPHEN PRN MG: 325 TABLET ORAL at 08:54

## 2018-06-17 RX ADMIN — HYDROCODONE BITARTRATE AND ACETAMINOPHEN PRN TAB: 5; 325 TABLET ORAL at 21:07

## 2018-06-17 RX ADMIN — TRAVOPROST SCH DROP: 0.04 SOLUTION/ DROPS OPHTHALMIC at 21:06

## 2018-06-17 NOTE — ASMTCMCOM
CM Note

 

CM Note                       

Notes:

Chart reviewed met with patient's daughter Bethany [phone]. She would like to have home health 

for angel management and to monitor B/P. Per therapy, no needs at this time. They have no 

preference for C . Will refer to [facility]. Patient to see Dr. Diamond for follow up of bladder 

cancer. CM to follow.



Plan : home to daughter's house in [place].

 

Date Signed:  06/17/2018 11:44 AM

Electronically Signed By:Porsha Montana RN

## 2018-06-17 NOTE — SOAPPROG
SOAP Progress Note


Assessment/Plan: 


Assessment: Bladder malignancy   Acute  CT cystogram concerning for prostate 

cancer and possible TCC of the bladder, normal PSA  and BPH is considered in 

his LUT assessment, discussed and consider TURBT with understanding this may 

not be successful based on mass effect


   


                   Obstruction of right ureter   Acute no dilation of ureter 

and good parenchyma, discussed options of PCN, attempt to place stent or 

retrograde yet doubtful can be done, consider contrast study if creat normalizes











Plan: as noted











06/17/18 09:29





Subjective: 





ambulating to BR, abdominal and flank pain improved


Objective: 





 Vital Signs











Temp Pulse Resp BP Pulse Ox


 


 36.7 C   70   15   169/80 H  92 


 


 06/17/18 08:39  06/17/18 08:39  06/17/18 08:39  06/17/18 08:39  06/17/18 08:39








 Laboratory Results





 06/15/18 04:26 





 06/17/18 04:30 





 











 06/16/18 06/17/18 06/18/18





 05:59 05:59 05:59


 


Intake Total  2159 


 


Output Total 1400 800 


 


Balance -1400 1359 














Physical Exam





- Physical Exam


General Appearance: alert


Respiratory: No respiratory distress


Abdomen: non-tender


Skin: warm/dry


Neuro/Psych: alert





ICD10 Worksheet


Patient Problems: 


 Problems











Problem Status Onset


 


AMANDA (acute kidney injury) Acute  


 


Bladder malignancy Acute  


 


Obstruction of right ureter Acute  


 


Bleeding Active

## 2018-06-17 NOTE — SOAPPROG
SOAP Progress Note


Assessment/Plan: 


Assessment:


1. Bladder mass


2. R ureteral obstruction / hydronephrosis due to #1


3. ?calyceal rupture


4. Renal failure, likely due to obstruction, improving





Given that PSA is normal, likely dx is bladder cancer.  Neuroendocrine variant 

of prostate cancer or other malignancy cannot be excluded. 














Plan:


- d/w dr lopez.  plan is for outpatient cystoscopy with biopsy + TURBT. 


- creatinine improving - no percutaneous nephrostomy for now. 


- pt to f/u with Dr. Crum in outpatient setting after biopsy.


- will order bone scan to complete staging.





25 min spent w/ pt and in coordination of care. 


06/17/18 11:19





Subjective: 





feeling a bit stronger today. 


Objective: 


exam unchanged


 Vital Signs











Temp Pulse Resp BP Pulse Ox


 


 36.7 C   70   15   169/80 H  92 


 


 06/17/18 08:39  06/17/18 08:39  06/17/18 08:39  06/17/18 10:39  06/17/18 08:39








 Laboratory Results





 06/15/18 04:26 





 06/17/18 04:30 





 











 06/16/18 06/17/18 06/18/18





 05:59 05:59 05:59


 


Intake Total  2159 


 


Output Total 1400 800 


 


Balance -1400 1359 














ICD10 Worksheet


Patient Problems: 


 Problems











Problem Status Onset


 


AMANDA (acute kidney injury) Acute  


 


Bladder malignancy Acute  


 


Obstruction of right ureter Acute  


 


Bleeding Active

## 2018-06-17 NOTE — HOSPPROG
Hospitalist Progress Note


Assessment/Plan: 





#Bladder Mass causing right sided hydronephrosis


-PSA not elevated, making the diagnosis of Bladder cancer more probable


-Cr is improving


-Angel is in 


#Low back pain and LLQ Pain


#High grade obstruction of renal collecting system due to bladder mass


#Bilateral renal and parapelvic nodules


#RLL 2 cm nodule


-no e/o of metastasis on CT w/o IV contrast


#AMANDA, vs acute on chronic, vs chronic


-Due to obstruction


-Improving


#constipation and abdominal distention


-s/p BM's





Plan:


-I spoke with Dr. Diamond today. He is not planning on any urological 

interventions at this time. He would like to see the patient f/u with him this 

week in clinic. The angel is to remain in. Cr is improving. As an outpatient, 

Dr. Diamond will consider urological options to include TURBT and Nephrostomy tube


-As for ONC diagnosis, this is pending follow up


-Advance Diet today, abd is less distended. He has had a bowel movement


-Avoid narcotics if possible as likely contributing to decreased gut motility


-Likely discharge tomorrow upon repeat labs and tolerating PO


-Amlodipine will be started for HTN


-PLan is for him to live with his daughter until he is strong enough to return 

living on his own








Subjective: no cp or sob. still with low back pain, but improving. Able to 

ambulate. no cp or sob. Angel is draining well.


Objective: 


 Vital Signs











Temp Pulse Resp BP Pulse Ox


 


 36.7 C   70   15   169/80 H  92 


 


 06/17/18 08:39  06/17/18 08:39  06/17/18 08:39  06/17/18 08:39  06/17/18 08:39








 Laboratory Results





 06/15/18 04:26 





 06/17/18 04:30 





 











 06/16/18 06/17/18 06/18/18





 05:59 05:59 05:59


 


Intake Total  2159 


 


Output Total 1400 800 


 


Balance -1400 1359 














- Physical Exam


Constitutional: no apparent distress


Eyes: PERRL


Ears, Nose, Mouth, Throat: moist mucous membranes


Cardiovascular: regular rate and rhythym, No edema


Respiratory: no respiratory distress


Gastrointestinal: normoactive bowel sounds


Skin: warm


Musculoskeletal: generalized weakness


Neurologic: AAOx3


Psychiatric: interacting appropriately, not anxious


Lymph, Heme, Immunologic: No petechiae





ICD10 Worksheet


Patient Problems: 


 Problems











Problem Status Onset


 


AMANDA (acute kidney injury) Acute  


 


Bladder malignancy Acute  


 


Obstruction of right ureter Acute  


 


Bleeding Active

## 2018-06-18 VITALS — DIASTOLIC BLOOD PRESSURE: 75 MMHG | SYSTOLIC BLOOD PRESSURE: 152 MMHG

## 2018-06-18 RX ADMIN — HYDROCODONE BITARTRATE AND ACETAMINOPHEN PRN TAB: 5; 325 TABLET ORAL at 01:27

## 2018-06-18 RX ADMIN — TAMSULOSIN HYDROCHLORIDE SCH MG: 0.4 CAPSULE ORAL at 09:50

## 2018-06-18 RX ADMIN — HYDROCODONE BITARTRATE AND ACETAMINOPHEN PRN TAB: 5; 325 TABLET ORAL at 05:43

## 2018-06-18 RX ADMIN — ACETAMINOPHEN PRN MG: 325 TABLET ORAL at 11:48

## 2018-06-18 RX ADMIN — DOCUSATE SODIUM AND SENNOSIDES SCH TAB: 50; 8.6 TABLET ORAL at 09:50

## 2018-06-18 NOTE — PDIAF
- Diagnosis


Diagnosis: bladder mass


Code Status: Do Not Resuscitate





- Medication Management


Discharge Medications: 


 Medications to Continue on Transfer





RX: Ibuprofen [Motrin (*)] 200 mg PO DAILY PRN 06/14/18 [Last Taken Unknown]


RX: Multivitamins [Multivitamin (*)] 1 each PO DAILY 06/14/18 [Last Taken 

Unknown]


RX: Tamsulosin HCl [Flomax 0.4 MG (*)] 0.4 mg PO HS 06/14/18 [Last Taken 06/12/ 18]


RX: Travoprost Z 0.004% [Travatan Z 0.004% (*)] 1 drops RTEYE DAILY 06/14/18 [

Last Taken 06/13/18]


RX: Hydrocodone/APAP 5/325 [Norco 5/325 (*)] 1 - 2 tab PO Q4HRS PRN #20 tab 06/ 18/18 [Last Taken Unknown]


RX: amLODIPine BESYLATE [Norvasc 5 mg (*)] 5 mg PO DAILY #14 tab 06/18/18 [Last 

Taken Unknown]








Discharge Medications: Refer to the Discharge Home Medication list for PRN 

reason.





- Orders


Services needed: Registered Nurse (for angel care and Blood pressure monitoring)


Diet Recommendation: no restrictions on diet


Diet Texture: Regular Texture Diet


Additional Instructions: 


follow up with Dr. Diamond as directed





- Follow Up Care


Current Providers and Referrals: 


ALCON SCHMIDT [Other] - As per Instructions

## 2018-06-18 NOTE — GDS
[f rep st]



                                                             DISCHARGE SUMMARY





DISCHARGE DIAGNOSES:  

1.  Bladder mass causing right hydronephrosis.

2.  High-grade obstruction of the renal collecting system due to bladder mass.

3.  Bilateral renal and parapelvic nodules.

4.  Right lower lobe 2 cm nodule.

5.  Acute kidney injury.  

6.  Constipation and abdominal distention.



CONSULTANTS:  

1.  Dr. Primo Diamond, Urology.

2.  Dr. Pato Ramirez, Oncology.



HOSPITAL COURSE:  

1.  Bladder mass causing right hydronephrosis:  Patient has been seen by Urology who recommended outp
atient treatments.  The bladder has been decompressed with a Maxwell catheter.  On day of discharge, th
e patient is feeling better and awaits bone scan for further imaging.

2.  Hypertension:  The patient's blood pressure was quite elevated throughout the stay.  He has been 
started on amlodipine.  He will need to follow up with his primary care provider for regarding furthe
r blood pressure management in the next 1-2 weeks post hospital discharge.



PHYSICAL EXAMINATION:  VITAL SIGNS:  On day of discharge, blood pressure 152/75, pulse of 67, respira
tory rate 16, O2 saturation 94% on 2.5 L.  Temperature afebrile.  GENERAL:  In no acute distress.  HE
ART:  S1, S2.  LUNGS:  Clear.  ABDOMEN:  Soft.  EXTREMITIES:  No edema.



PERTINENT LABORATORY/X-RAY DATA:  An MRI of the abdomen done 06/14/2018, refer to report.  Chest CT d
one 06/15/2018, refer to report.  Bone scan is pending.



DISCHARGE MEDICATIONS:  Please refer to discharge medication reconciliation in [facility] for full deta
ils.  Below is a preliminary list: 



New medications:  Norvasc 5 mg p.o. daily, Norco 5/325 mg 1-2 tablets p.o. q.4 hours p.r.n. pain pres
cription for #20 was given.



DISCHARGE INSTRUCTIONS:  The patient will be discharged from the hospital.  Once again, he will need 
to follow up with his primary care provider in the next week for routine hospital followup and blood 
pressure management, as well as Dr. Diamond for further management of his bladder tumor with outpatient
 Oncology followup as needed.





Job #:  [id number]

## 2018-06-18 NOTE — ASMTCMCOM
CM Note

 

CM Note                       

Notes:

Met with patient and his daughter. He will dc later today after his bone scan. He will have [facility] 

for HHC services. RN for angel care. Daughter provided with loan closet lists as well as the book 

on services in [facility] for alternative at home care. He is discharging to his daughters home 


in [place] and will be followed by Primo Bo. CM available should other needs arise.



Plan Home to daughter's with HHC.

 

Date Signed:  06/18/2018 11:04 AM

Electronically Signed By:Porsha Montana RN

## 2018-06-18 NOTE — ASMTCMCOM
CM Note

 

CM Note                       

Notes:

Chart reviewed. Discussed HHC needs with MD. [facility] RN to follow, [facility] form complete. [facility] 

palliative referral placed in allscripts. CM available should other needs arise.



Plan: Home to daughter's house with HHC.

 

Date Signed:  06/18/2018 12:26 PM

Electronically Signed By:Porsha Montana RN

## 2018-06-19 NOTE — ASDISCHSUM
----------------------------------------------

Discharge Information

----------------------------------------------

Plan Status:Home with Home Health                    Medically Cleared to Leave:06/18/2018

Discharge Date:06/18/2018 03:45 PM                    D/C Disposition:Home Health Service

[facility] D/C Disposition:Home, Routine, Self-Care         Projected Discharge Date:06/18/2018 11:00 AM

Transportation at D/C:Family                         Discharge Delay Reason:

Follow-Up Date:06/18/2018 11:00 AM                   Discharge Slot:

Final Diagnosis:

----------------------------------------------

Placement Information

----------------------------------------------

Referral Type:*Home Health Care Services             Referral ID:[id number]

Provider Name:[facility]

Address [address] [facility] [address]                  Phone [phone]

Address 2:                                           Fax [phone]

City:[place]                                         Selection Factors:

State:[place]

 

Referral Type:Palliative Care                        Referral ID:[id number]

Provider Name:[facility]

Address 1:[address] [place]                       Phone [phone]

Address 2:                                           Fax [phone]

City:[place]                                       Selection Factors:

State:[place]

 

----------------------------------------------

Patient Contact Information

----------------------------------------------

Contact Name:SENA                              Relationship:Daughter

Address:                                             Home [phone]

                                                     Work Phone:

City:                                                [facility] Phone:

[facility] Code:                                      Email:

----------------------------------------------

Financial Information

----------------------------------------------

Financial Class:Medicare Advantage Plans

Primary Plan Desc:[employer]ICARE               Primary Plan Number:[id number]

Secondary Plan Desc:                                 Secondary Plan Number:

 

 

----------------------------------------------

Assessment Information

----------------------------------------------

----------------------------------------------

[facility] CM Progress Note

----------------------------------------------

CM Note

 

CM Note                       

Notes:

Chart reviewed. Patient is an 84 year old male from [facility]. He was brought in by his 

daughter Bethany when she became concerned about his having difficulty with breathing after having 

been diagnosed with prostatitis and been taking antibiotics He normally spends most of his time in 

[facility] and is typically very active riding his bike to the store and into [place]. She shares 

with me she hopes to get him to come down to her home in [place] more often He lost his wife Barbie 

about 17 years ago and most recently his  moved to [place] to be closer to her 

family. He is awaiting testing at this time. Needs TBD.



Plan: TBD

 

Date Signed:  06/14/2018 02:11 PM

Electronically Signed By:Porsha Montana RN

 

 

----------------------------------------------

[facility] CM Progress Note

----------------------------------------------

CM Note

 

CM Note                       

Notes:

Chart reviewed. Still awaiting diagnostic results. Dr. Diamond from urology in to see the 

patient. The patient and his daughter have ambulated in the halls. His gait is steady, No therapies 


currently ordered. CM to follow as needs are to be determined.



Plan: TBD

 

Date Signed:  06/15/2018 01:03 PM

Electronically Signed By:Porsha Montana RN

 

 

----------------------------------------------

[facility] CM Progress Note

----------------------------------------------

CM Note

 

CM Note                       

Notes:

Chart reviewed met with patient's daughter Bethany [phone]. She would like to have home health 

for angel management and to monitor B/P. Per therapy, no needs at this time. They have no 

preference for [facility] . Will refer to [facility]. Patient to see Dr. Diamond for follow up of bladder 

cancer. CM to follow.



Plan : home to daughter's house in [place].

 

Date Signed:  06/17/2018 11:44 AM

Electronically Signed By:Porsha Montana RN

 

 

----------------------------------------------

[facility] CM Progress Note

----------------------------------------------

CM Note

 

CM Note                       

Notes:

Met with patient and his daughter. He will dc later today after his bone scan. He will have [facility] 

for HHC services. RN for [facility]. Daughter provided with [employer] as well as the book 

on services in [facility] for alternative at home care. He is discharging to his daughters home 


in [place] and will be followed by Primo Bo. CM available should other needs arise.



Plan Home to daughter's with HHC.

 

Date Signed:  06/18/2018 11:04 AM

Electronically Signed By:Porsha Montana RN

 

 

----------------------------------------------

[facility] CM Progress Note

----------------------------------------------

CM Note

 

CM Note                       

Notes:

Chart reviewed. Discussed HHC needs with MD. [facility] RN to follow, [facility] form complete. [facility] 

palliative referral placed in [employer]scripts. CM available should other needs arise.



Plan: Home to daughter's house with HHC.

 

Date Signed:  06/18/2018 12:26 PM

Electronically Signed By:Porsha Montana RN

 

 

----------------------------------------------

Intervention Information

----------------------------------------------

## 2018-06-26 ENCOUNTER — HOSPITAL ENCOUNTER (INPATIENT)
Dept: HOSPITAL 80 - FED | Age: 83
LOS: 4 days | Discharge: HOME | DRG: 872 | End: 2018-06-30
Attending: FAMILY MEDICINE | Admitting: FAMILY MEDICINE
Payer: COMMERCIAL

## 2018-06-26 DIAGNOSIS — H91.90: ICD-10-CM

## 2018-06-26 DIAGNOSIS — I10: ICD-10-CM

## 2018-06-26 DIAGNOSIS — N13.30: ICD-10-CM

## 2018-06-26 DIAGNOSIS — N39.0: ICD-10-CM

## 2018-06-26 DIAGNOSIS — B96.5: ICD-10-CM

## 2018-06-26 DIAGNOSIS — A41.89: Primary | ICD-10-CM

## 2018-06-26 DIAGNOSIS — Z66: ICD-10-CM

## 2018-06-26 DIAGNOSIS — D41.4: ICD-10-CM

## 2018-06-26 DIAGNOSIS — N40.1: ICD-10-CM

## 2018-06-26 DIAGNOSIS — H35.30: ICD-10-CM

## 2018-06-26 LAB — PLATELET # BLD: 335 10^3/UL (ref 150–400)

## 2018-06-26 PROCEDURE — G0378 HOSPITAL OBSERVATION PER HR: HCPCS

## 2018-06-26 PROCEDURE — C1769 GUIDE WIRE: HCPCS

## 2018-06-26 PROCEDURE — C1729 CATH, DRAINAGE: HCPCS

## 2018-06-26 RX ADMIN — ACETAMINOPHEN PRN MG: 500 TABLET ORAL at 20:30

## 2018-06-26 RX ADMIN — TRAVOPROST SCH: 0.04 SOLUTION/ DROPS OPHTHALMIC at 22:15

## 2018-06-26 NOTE — GHP
[f rep st]



                                                            HISTORY AND PHYSICAL





DATE OF ADMISSION:  2018



CHIEF COMPLAINT:  Fever and chills.



HISTORY OF PRESENT ILLNESS:  This is an 84-year-old male who was discharged from [facility] on 2018, after he was found to have a bladder mass causing right hydronephrosis.  The lilly
ent was discharged home with a Maxwell catheter.  Today around noon, the patient developed some chills 
where he sat down and took some Tylenol.  After that, he went to see his Urologist, Dr. Diamond, who pe
rformed a cystoscopy where he was found to have extreme irritation in his bladder as well as evidence
 of bladder mass.  After the procedure, he was found to have a temperature of 101, and was sent to [facility] emergency department for further evaluation.  His Maxwell catheter was changed today by his urologist
.



PAST MEDICAL HISTORY:  

1.  Macular degeneration. 

2.  BPH. 

3.  Hearing loss. 

4.  Lower GI bleed due to diverticulosis. 

5.  Internal hemorrhoids. 

6.  Recently diagnosed bladder mass. 

7.  Recently diagnosed hypertension.



PAST SURGICAL HISTORY:  

1.  Tonsillectomy. 

2.  Adenoidectomy. 

3.  EGD and colonoscopy in 2018.



HOME MEDICATIONS:  Reviewed.  Refer to [facility] [employer] for details.



ALLERGIES:  No known drug allergies.



SOCIAL HISTORY:  Lives in [place].  He denies any tobacco.  He drinks beer occasionally.  He denies a
ny illicit drug use.



FAMILY HISTORY:  Father is  due to coronary artery disease.  Mother passed away at the age of
 98.



REVIEW OF SYSTEMS:  A comprehensive 10-point review of systems was done and was negative except for a
s mentioned in the HPI.



PHYSICAL EXAM:  VITAL SIGNS:  Blood pressure 161/85, pulse of 85, respiratory rate 16, O2 saturation 
93% on room air, temperature 38.6.  GENERAL:  In no acute distress.  HEAD:  Normocephalic, atraumatic
.  EYES:  PERRLA.  Sclerae anicteric.  MOUTH:  Moist mucous membranes.  NECK:  Supple.  No lymphadeno
bhupinder.  CARDIOVASCULAR:  S1, S2 no JVD.  No lower extremity edema.  PULMONARY:  Lungs are clear.  No 
wheezes, rales, or rhonchi.  ABDOMEN:  Soft, nontender, nondistended.  No guarding or rebound tendern
ess.  Normoactive bowel sounds.  :  Maxwell catheter in place with a leg bag draining red tinged urin
e.  EXTREMITIES:  No clubbing or cyanosis.  NEURO:  Cranial nerves II through XII grossly intact.  No
 focal motor or sensory deficits.  SKIN:  Clear.  No rashes.



DIAGNOSTICS:  WBC is 20, hemoglobin 13.6, hematocrit 41.7, platelets 335.  Venous lactic acid 1.3.  S
odium 135, potassium 4.4, chloride 99, CO2 25, BUN 31, creatinine 1.7, glucose 83.  UA:  With 2+ prot
ein, 3+ blood, 1+ leukocyte esterase, 50 to 182 WBCs and 50 to 182 RBCs.  Bone scan done prior to hos
pital discharge last week showed no definite pathologic uptake.  Refer to report for full details.



ASSESSMENT AND PLAN:  This is an 84-year-old male recently diagnosed with a bladder mass status post 
outpatient cystoscopy today presenting with:  

1.  Sepsis due to urinary source. 

2.  Bladder mass. 

3.  Elevated creatinine. 

4.  Hypertension that was diagnosed last visit and recently started on amlodipine.



PLAN:  

1.  Admit to Med Surg.  

2.  Will treat UTI with Rocephin 2 g IV daily and follow up urine cultures. 

3.  Monitor for signs and symptoms of severe sepsis and septic shock. 

4.  Will consult Dr. Diamond, his Urologist, to help plan for further bladder tumor management and poss
ible biopsy.  The patient also still needs a CT, which has been held due to his renal insufficiency. 
 

5.  The patient requests to be DNR status.





Job #:  [id number]

## 2018-06-26 NOTE — EDPHY
H & P


Smoking Status: Never smoked


Time Seen by Provider: 06/26/18 15:58


HPI/ROS: 





CHIEF COMPLAINT:  Fever, chills, cough





HISTORY OF PRESENT ILLNESS:  84-year-old male presents to the emergency 

department by private vehicle with his daughter with sudden onset of chills 

that began 11 o'clock this morning.  The patient was feeling well this morning 

when he woke up and made himself breakfast and then at 11 o'clock eat a sudden 

onset of shaking chills.  His daughter notes that he has been coughing all day.

  He feels mildly short of breath.  He has no pain in his chest.  Patient has a 

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 

Dr. Primo Diamond.  Unable to obtain the biopsy today and because of his history 

of shaking chills, he was sent to the emergency department for evaluation.  The 

patient has an indwelling Maxwell catheter that was placed on 6/13/2018 this was 

due to the obstruction caused by the tumor.  The patient has no dysuria.  No 

back pain.  No headache.  His daughter noted that he was overall more weak 

today.





REVIEW OF SYSTEMS:


Constitutional:  Subjective chills as above.


Eyes:  No double or blurry vision.


ENT:  No sore throat.


Respiratory:  Cough, shortness of breath


Cardiac:  No chest pain.


Gastrointestinal:  No abdominal pain, vomiting or diarrhea.


Genitourinary:  No dysuria.


Musculoskeletal:  No neck or back pain.


Skin:  No rashes.


Neurological:  No headache. (Dunia Eric)


Past Medical/Surgical History: 





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 

macular degeneration, diverticulitis, orthopedic injuries (Dunia Eric)


Social History: 





The patient is a  and lives independently in [place], [place].  He has 

been staying with his daughter who lives locally in [place] (Dunia Eric)


Physical Exam: 





General Appearance:  Alert, no distress.  Current temperature 36.6 degrees, 

heart rate 77, initial blood pressure 178/131, this was repeated the in the 

emergency department on my examination and was 179/82.  94% on room air.


Eyes:  Pupils equal and round.  Extraocular motions are all intact.


ENT:  Mouth:  Mucous membranes moist.


Respiratory:  No wheezing, rhonchi, or rales, lungs are clear to auscultation.


Cardiovascular:  Regular rate and rhythm.


Gastrointestinal:  Abdomen is soft and nontender, no masses, no rebound or 

guarding, bowel sounds normal.  No CVA tenderness bilaterally.


Neurological:  Alert and oriented x 3, cranial nerves II through XII grossly 

intact


Skin:  Warm and dry, no rashes.


Musculoskeletal:  Nontender to palpate along the cervical, thoracic or lumbar 

spine.  Neck is supple.


Extremities:  Full range of motion and no peripheral edema.


Psychiatric:  Patient is oriented X 3, there is no agitation. (Dunia Eric)


Constitutional: 


 Initial Vital Signs











Temperature (C)  36.6 C   06/26/18 15:52


 


Heart Rate  77   06/26/18 15:52


 


Respiratory Rate  17   06/26/18 15:52


 


Blood Pressure  178/131 H  06/26/18 15:52


 


O2 Sat (%)  94   06/26/18 15:52








 











O2 Delivery Mode               Room Air














Allergies/Adverse Reactions: 


 





No Known Allergies Allergy (Verified 06/26/18 15:54)


 








Home Medications: 














 Medication  Instructions  Recorded


 


Acetaminophen [Tylenol  mg 1,000 mg PO Q8H PRN 06/26/18





(*)]  


 


Ibuprofen [Motrin (*)] 400 mg PO Q4H PRN 06/26/18


 


Ibuprofen/Diphenhydramine HCl 1 each PO HS PRN 06/26/18





[Advil Pm Liqui-Gels]  


 


Tamsulosin HCl [Flomax 0.4 MG (*)] 0.4 mg PO DAILY@1830 06/26/18


 


Travoprost Z 0.004% [Travatan Z 1 drops RTEYE HS 06/26/18





0.004% (*)]  


 


amLODIPine BESYLATE [Norvasc 5 mg 5 mg PO HS 06/26/18





(*)]  














Medical Decision Making





- Diagnostics


Imaging: I viewed and interpreted images myself





- Diagnostics


Imaging Results: 


 Imaging Impressions





Chest X-Ray  06/26/18 16:22


Impression:  Probable basilar atelectasis without definite evidence of 

pneumonia.


 


 











ED Course/Re-evaluation: 





Lactate normal.





Initial temperature is 36.6 degrees and then repeat temperature was 38.6 

degrees.





Laboratory studies reveal elevated white blood cell count of 20,000.  His 

creatinine is 1.7 on 6/18/2018 his creatinine was 1.6.





Patient was started on 1 g of IV ceftriaxone.  He will be admitted to the 

hospitalist, Dr. Contreras Kirkland.





I did speak with the on-call urologist for Dr. Diamond, Dr. Macias, as he is 

aware that this patient will be admitted to the hospitalist. (Dunia Eirc)


Differential Diagnosis: 





Including but not limited to urosepsis, urinary tract infection, pyelonephritis

, kidney stone, pneumonia, influenza (Dunia Eric)





- Data Points


Laboratory Results: 


 Laboratory Results





 06/26/18 16:15 





 06/26/18 16:15 





 











  06/26/18 06/26/18 06/26/18





  16:40 16:24 16:15


 


WBC      





    


 


RBC      





    


 


Hgb      





    


 


Hct      





    


 


MCV      





    


 


MCH      





    


 


MCHC      





    


 


RDW      





    


 


Plt Count      





    


 


MPV      





    


 


Neut % (Auto)      





    


 


Lymph % (Auto)      





    


 


Mono % (Auto)      





    


 


Eos % (Auto)      





    


 


Baso % (Auto)      





    


 


Nucleat RBC Rel Count      





    


 


Absolute Neuts (auto)      





    


 


Absolute Lymphs (auto)      





    


 


Absolute Monos (auto)      





    


 


Absolute Eos (auto)      





    


 


Absolute Basos (auto)      





    


 


Absolute Nucleated RBC      





    


 


Immature Gran %      





    


 


Immature Gran #      





    


 


VBG Lactic Acid      





    


 


Sodium      135 mEq/L mEq/L





     (135-145) 


 


Potassium      4.4 mEq/L mEq/L





     (3.3-5.0) 


 


Chloride      99 mEq/L mEq/L





     () 


 


Carbon Dioxide      25 mEq/l mEq/l





     (22-31) 


 


Anion Gap      11 mEq/L mEq/L





     (8-16) 


 


BUN      31 mg/dL H mg/dL





     (7-23) 


 


Creatinine      1.7 mg/dL H mg/dL





     (0.7-1.3) 


 


Estimated GFR      39 





    


 


Glucose      83 mg/dL mg/dL





     () 


 


POC Lactic Acid Naldo    1.0 mmol/L mmol/L  





    (0.7-2.1)  


 


Calcium      9.2 mg/dL mg/dL





     (8.5-10.4) 


 


Urine Color  RED     





    


 


Urine Appearance  MODERATELY TURBID     





    


 


Urine pH  6.0     





   (5.0-7.5)   


 


Ur Specific Gravity  1.012     





   (1.002-1.030)   


 


Urine Protein  2+  H     





   (NEGATIVE)   


 


Urine Ketones  NEGATIVE     





   (NEGATIVE)   


 


Urine Blood  3+  H     





   (NEGATIVE)   


 


Urine Nitrate  NEGATIVE     





   (NEGATIVE)   


 


Urine Bilirubin  NEGATIVE     





   (NEGATIVE)   


 


Urine Urobilinogen  NEGATIVE EU EU    





   (0.2-1.0)   


 


Ur Leukocyte Esterase  1+  H     





   (NEGATIVE)   


 


Urine RBC   /hpf H /hpf    





   (0-3)   


 


Urine WBC   /hpf H /hpf    





   (0-3)   


 


Ur Epithelial Cells  NONE SEEN /lpf /lpf    





   (NONE-1+)   


 


Urine Glucose  NEGATIVE     





   (NEGATIVE)   














  06/26/18 06/26/18





  16:15 16:15


 


WBC  20.13 10^3/uL H 10^3/uL  





   (3.80-9.50)  


 


RBC  4.30 10^6/uL L 10^6/uL  





   (4.40-6.38)  


 


Hgb  13.6 g/dL L g/dL  





   (13.7-17.5)  


 


Hct  41.7 % %  





   (40.0-51.0)  


 


MCV  97.0 fL fL  





   (81.5-99.8)  


 


MCH  31.6 pg pg  





   (27.9-34.1)  


 


MCHC  32.6 g/dL g/dL  





   (32.4-36.7)  


 


RDW  12.5 % %  





   (11.5-15.2)  


 


Plt Count  335 10^3/uL 10^3/uL  





   (150-400)  


 


MPV  9.3 fL fL  





   (8.7-11.7)  


 


Neut % (Auto)  88.9 % H %  





   (39.3-74.2)  


 


Lymph % (Auto)  3.7 % L %  





   (15.0-45.0)  


 


Mono % (Auto)  6.7 % %  





   (4.5-13.0)  


 


Eos % (Auto)  0.1 % L %  





   (0.6-7.6)  


 


Baso % (Auto)  0.2 % L %  





   (0.3-1.7)  


 


Nucleat RBC Rel Count  0.0 % %  





   (0.0-0.2)  


 


Absolute Neuts (auto)  17.88 10^3/uL H 10^3/uL  





   (1.70-6.50)  


 


Absolute Lymphs (auto)  0.75 10^3/uL L 10^3/uL  





   (1.00-3.00)  


 


Absolute Monos (auto)  1.35 10^3/uL H 10^3/uL  





   (0.30-0.80)  


 


Absolute Eos (auto)  0.03 10^3/uL 10^3/uL  





   (0.03-0.40)  


 


Absolute Basos (auto)  0.04 10^3/uL 10^3/uL  





   (0.02-0.10)  


 


Absolute Nucleated RBC  0.00 10^3/uL 10^3/uL  





   (0-0.01)  


 


Immature Gran %  0.4 % %  





   (0.0-1.1)  


 


Immature Gran #  0.08 10^3/uL 10^3/uL  





   (0.00-0.10)  


 


VBG Lactic Acid    1.3 mmol/L mmol/L





    (0.7-2.1) 


 


Sodium    





   


 


Potassium    





   


 


Chloride    





   


 


Carbon Dioxide    





   


 


Anion Gap    





   


 


BUN    





   


 


Creatinine    





   


 


Estimated GFR    





   


 


Glucose    





   


 


POC Lactic Acid Naldo    





   


 


Calcium    





   


 


Urine Color    





   


 


Urine Appearance    





   


 


Urine pH    





   


 


Ur Specific Gravity    





   


 


Urine Protein    





   


 


Urine Ketones    





   


 


Urine Blood    





   


 


Urine Nitrate    





   


 


Urine Bilirubin    





   


 


Urine Urobilinogen    





   


 


Ur Leukocyte Esterase    





   


 


Urine RBC    





   


 


Urine WBC    





   


 


Ur Epithelial Cells    





   


 


Urine Glucose    





   











Medications Given: 


 





Acetaminophen (Tylenol)  1,000 mg PO Q8H PRN


   PRN Reason: Pain, Mild/Fever, Can Take PO


   Stop: 12/23/18 18:58


   Last Admin: 06/26/18 20:30 Dose:  1,000 mg


Amlodipine Besylate (Norvasc)  5 mg PO HS [facility]


   Stop: 12/23/18 20:59


   Last Admin: 06/26/18 20:29 Dose:  5 mg


Travoprost (Travatan Z 0.004%)  1 drops RTEYE HS [facility]


   Stop: 12/23/18 20:59


   Last Admin: 06/26/18 22:15 Dose:  Not Given





Discontinued Medications





Ceftriaxone Sodium/Dextrose (Rocephin 1 Gm (Premix))  50 mls @ 100 mls/hr IV 

EDNOW ONE


   PRN Reason: Protocol


   Stop: 06/26/18 18:13


   Last Admin: 06/26/18 18:13 Dose:  50 mls





Point of Care Test Results: 


 Blood Gas/Lactic Acid-Venous











  06/26/18





  16:24


 


POC Lactic Acid Naldo  1.0 mmol/L mmol/L





   (0.7-2.1) 














Departure





- Departure


Disposition: [facility] Inpatient Acute


Clinical Impression: 


Urinary tract infection


Qualifiers:


 Urinary tract infection type: site unspecified Hematuria presence: without 

hematuria Qualified Code(s): N39.0 - Urinary tract infection, site not specified





Fever


Qualifiers:


 Fever type: unspecified Qualified Code(s): R50.9 - Fever, unspecified





Condition: Good

## 2018-06-27 LAB
INR PPP: 1.29 (ref 0.83–1.16)
PLATELET # BLD: 329 10^3/UL (ref 150–400)
PROTHROMBIN TIME: 16.3 SEC (ref 12–15)

## 2018-06-27 RX ADMIN — TAMSULOSIN HYDROCHLORIDE SCH MG: 0.4 CAPSULE ORAL at 18:36

## 2018-06-27 RX ADMIN — TRAVOPROST SCH DROP: 0.04 SOLUTION/ DROPS OPHTHALMIC at 20:44

## 2018-06-27 RX ADMIN — ZOLPIDEM TARTRATE PRN MG: 5 TABLET ORAL at 22:23

## 2018-06-27 NOTE — GCON
[f rep st]



                                                                    CONSULTATION





DATE OF CONSULTATION:  06/27/2018



REASON FOR CONSULT:  Urinary tract infection, bladder mass, kidney obstruction.



HISTORY OF PRESENT ILLNESS:  This is a pleasant 84-year-old male who was actually seen in our office 
for a cystoscopy, which he underwent without difficulty, but immediately thereafter developed sudden-
onset fever and confusion.  He was admitted to [facility] via the emergency room for 
further evaluation, and upon evaluation in the ER, it was found he likely has a urinary tract infecti
on.  He has been started on ceftriaxone, pending cultures.  This patient has a pertinent history of a
 bladder mass, which was further evaluated and consistent with a possible bladder cancer on cystoscop
y in the office, along with right-sided kidney obstruction previously seen on imaging from last hospi
talization stay.



PAST MEDICAL HISTORY:  Macular degeneration, BPH, hearing loss, diverticulosis, hemorrhoids, bladder 
mass, hypertension, hydronephrosis.



SURGICAL HISTORY:  Tonsillectomy, adenoidectomy, EGD and colonoscopy in 2018.



HOME MEDICATIONS:  Please refer to [facility] for details.



ALLERGIES:  No known drug allergies.



SOCIAL HISTORY:  Lives in [place].  Daughter, who is at his bedside, lives here in [place].  Denies to
bacco.  Occasional beer drinking.  No illicit drug use.



FAMILY HISTORY:  Not pertinent.



REVIEW OF SYSTEMS:  10-point review of systems negative, except as mentioned in the HPI, with the add
ition of fatigue.



PHYSICAL EXAM:  VITAL SIGNS:  Blood pressure 138/81, respirations 16, O2 91 on room air, temperature 
37.1.  GENERAL:  This is a somewhat ill-appearing man in no acute distress.  HEENT:  Normocephalic, a
traumatic.  Extraocular movements intact.  NECK:  Supple.  No lymphadenopathy.  Trachea midline.  RES
PIRATORY:  No accessory respiratory muscle use.  CARDIAC:  Regular rate and rhythm.  No lower extremi
ty edema.  No obvious JVD.  GI:  Abdomen was soft, nondistended, nontender to palpation.  :  No CVA
 tenderness.  Bladder nontender to palpation.  Catheter in place.  INTEGUMENT:  No obvious rashes or 
lesions.  NEURO:  Patient was alert and oriented.  Affect appropriate to situation.



LABS:  His white blood cell count is 28.84, hemoglobin 12.6, hematocrit 37.4, platelets 329.  Blood g
as:  His lactic acid is 1.3.  Chemistry:  Sodium 135, potassium 4.4, chloride 101, carbon dioxide 24,
 anion gap 10, BUN 27, creatinine 1.6, glucose 92.  Urinalysis was positive for blood, white blood ce
lls.  Urine and blood culture pending.



ASSESSMENT AND PLAN:  Urinary tract infection, bladder mass, kidney obstruction.  This case has been 
reviewed with Dr. Diamond.  Recommend that the patient consider having a percutaneous tube to help reli
amrik obstruction in the right kidney, which he agrees to.  Discussed recommendation for transurethral 
resection of bladder tumor once patient's infection has been managed in the future.  Discussed possib
le need for more aggressive surgical measures pending results of the transurethral resection of bladd
er tumor.  At this point, patient and his daughter are declining significant surgical intervention, b
ut do approve of a percutaneous tube along with transurethral resection of bladder tumor in the futur
e.





Job #:  [id number]

## 2018-06-27 NOTE — ASMTCMCOM
CM Note

 

CM Note                       

Notes:

Pt is current with [facility] for HC RN. CM will follow for add'l needs.

 

Date Signed:  06/27/2018 10:02 AM

Electronically Signed By:Sammie Jones LCSW

## 2018-06-27 NOTE — HOSPPROG
Hospitalist Progress Note


Assessment/Plan: 





83 yo M admitted w sepsis 2/2 urinary source





sepsis: leukocytosis, fever and source





uti: catheter changed in office


   ceftriaxone


   await culture data





bladder mass: plan for biopsy vs resection on this admit





proph: hold lmwh given need for possible procedures





R hydro: perc neph tube





dispo: inpt


Subjective: case d/w dr lloyd


Objective: 


 Vital Signs











Temp Pulse Resp BP Pulse Ox


 


 37.1 C   75   16   138/81 H  91 L


 


 06/27/18 12:39  06/27/18 12:39  06/27/18 12:39  06/27/18 12:39  06/27/18 12:39








 Laboratory Results





 06/27/18 04:15 





 06/27/18 04:15 





 











 06/26/18 06/27/18 06/28/18





 05:59 05:59 05:59


 


Intake Total  400 


 


Output Total  850 


 


Balance  -450 














- Physical Exam


Constitutional: no apparent distress, appears nourished


Eyes: PERRL, anicteric sclera


Ears, Nose, Mouth, Throat: moist mucous membranes, hearing normal


Cardiovascular: regular rate and rhythym, no murmur, rub, or gallop


Respiratory: no respiratory distress, no rales or rhonchi


Gastrointestinal: normoactive bowel sounds, soft, non-tender abdomen


Genitourinary: no bladder fullness, No angel in urethra


Skin: warm, normal color


Musculoskeletal: full muscle strength


Neurologic: AAOx3





ICD10 Worksheet


Patient Problems: 


 Problems











Problem Status Onset


 


Fever Acute  


 


Urinary tract infection Acute  


 


Bleeding Active  


 


AMANDA (acute kidney injury) Acute  


 


Bladder malignancy Acute  


 


Obstruction of right ureter Acute

## 2018-06-28 LAB
INR PPP: 1.3 (ref 0.83–1.16)
PLATELET # BLD: 286 10^3/UL (ref 150–400)
PROTHROMBIN TIME: 16.4 SEC (ref 12–15)

## 2018-06-28 PROCEDURE — 0T9030Z DRAINAGE OF RIGHT KIDNEY WITH DRAINAGE DEVICE, PERCUTANEOUS APPROACH: ICD-10-PCS

## 2018-06-28 RX ADMIN — TRAVOPROST SCH DROP: 0.04 SOLUTION/ DROPS OPHTHALMIC at 22:30

## 2018-06-28 RX ADMIN — CEFEPIME SCH MLS: 1 INJECTION, POWDER, FOR SOLUTION INTRAMUSCULAR; INTRAVENOUS at 12:56

## 2018-06-28 RX ADMIN — TAMSULOSIN HYDROCHLORIDE SCH MG: 0.4 CAPSULE ORAL at 18:23

## 2018-06-28 RX ADMIN — ZOLPIDEM TARTRATE PRN MG: 5 TABLET ORAL at 22:25

## 2018-06-28 RX ADMIN — ACETAMINOPHEN PRN MG: 500 TABLET ORAL at 02:42

## 2018-06-28 RX ADMIN — CEFEPIME SCH MLS: 1 INJECTION, POWDER, FOR SOLUTION INTRAMUSCULAR; INTRAVENOUS at 22:22

## 2018-06-28 NOTE — PDRADPN
Radiology Procedure Note


Date of Procedure: 06/28/18


Radiologist: Femi Cabrera


Anesthesia: IV Sedation


Pre-op Diagnosis: right hydronephrosis


Post-op Diagnosis: same


Procedure: right nephrostomy tube placement


Finding(s): Multiple perirenal cysts, mildly dilated renal system


Inf/Abcess present in the surg proc area at time of surgery?: No


EBL: Minimal


Drains: Nephrostomy

## 2018-06-28 NOTE — HOSPPROG
Hospitalist Progress Note


Assessment/Plan: 





83 yo M admitted w sepsis 2/2 urinary source





sepsis: leukocytosis, fever and source


   septic physiology has resolved





uti: urine growing pan sens psuedomonas


   switched to cefepime


   


bladder mass: plan for biopsy vs resection after clearance of infection





proph: hold lmwh given need for possible procedures





R hydro: perc neph tube today





hyponatremia: give 1 L NS- suspected hypovolemic   





AHRF: atelectasis I suspect





dispo: inpt


Subjective: case d/w gutierrez mac, urology PA.  s/p percutaneous nephrostomy tube


Objective: 


 Vital Signs











Temp Pulse Resp BP Pulse Ox


 


 36.5 C   72   16   143/69 H  96 


 


 06/28/18 13:00  06/28/18 14:04  06/28/18 14:04  06/28/18 14:04  06/28/18 14:04








 Laboratory Results





 06/28/18 04:28 





 06/28/18 04:28 





 











 06/27/18 06/28/18 06/29/18





 05:59 05:59 05:59


 


Intake Total  894 


 


Output Total  775 250


 


Balance  119 -250








 











PT  16.4 SEC (12.0-15.0)  H  06/28/18  04:28    


 


INR  1.30  (0.83-1.16)  H  06/28/18  04:28    














- Physical Exam


Constitutional: no apparent distress, appears nourished


Eyes: PERRL, anicteric sclera


Ears, Nose, Mouth, Throat: moist mucous membranes, hearing normal


Cardiovascular: regular rate and rhythym, no murmur, rub, or gallop, other (

crackles that clear w coughing)


Respiratory: no respiratory distress, no rales or rhonchi


Gastrointestinal: normoactive bowel sounds, soft, non-tender abdomen


Genitourinary: no bladder fullness, angel in urethra


Skin: warm, normal color


Musculoskeletal: full muscle strength, no muscle tenderness


Neurologic: AAOx3, sensation intact bilaterally





ICD10 Worksheet


Patient Problems: 


 Problems











Problem Status Onset


 


Fever Acute  


 


Urinary tract infection Acute  


 


Bleeding Active  


 


AMANDA (acute kidney injury) Acute  


 


Bladder malignancy Acute  


 


Obstruction of right ureter Acute

## 2018-06-28 NOTE — PDMN
Medical Necessity


Medical necessity: Change to IP, as of 6/27/18, per MD & MCG [id number]; los >2 

mn for ongoing management of sepsis secondary to UTI & bladder mass; admit for 

further monitoring, IV abx, IVFs & Urology consult w/biopsy vs resection; per 

progress note & order 6/27/18

## 2018-06-28 NOTE — SOAPPROG
SOAP Progress Note


Assessment/Plan: 


Assessment:








Sepsis





Bladder mass





Renal obstruction

















Plan:





Antibiotics per culture. 





Will plan for TURBT in the next few weeks after patient has recovered from 

infection. 





Appreciate placement of perc tube. 





06/28/18 13:39





Subjective: 





Feels better today aand ready for d/c


Objective: 





 Vital Signs











Temp Pulse Resp BP Pulse Ox


 


 36.8 C   59 L  18   132/63 H  99 


 


 06/28/18 08:27  06/28/18 08:27  06/28/18 08:27  06/28/18 12:10  06/28/18 12:11








 Laboratory Results





 06/28/18 04:28 





 06/28/18 04:28 





 











 06/27/18 06/28/18 06/29/18





 05:59 05:59 05:59


 


Intake Total  894 


 


Output Total  775 250


 


Balance  119 -250








 











PT  16.4 SEC (12.0-15.0)  H  06/28/18  04:28    


 


INR  1.30  (0.83-1.16)  H  06/28/18  04:28    














Physical Exam





- Physical Exam


General Appearance: alert, no apparent distress


Respiratory: normal breath sounds


Abdomen: other (right sided perc tube with mildly bloody urine)





ICD10 Worksheet


Patient Problems: 


 Problems











Problem Status Onset


 


Fever Acute  


 


Urinary tract infection Acute  


 


Bleeding Active  


 


AMANDA (acute kidney injury) Acute  


 


Bladder malignancy Acute  


 


Obstruction of right ureter Acute

## 2018-06-28 NOTE — PDRADPRE
Radiology History & Physical


Indication for procedure: cancer


Home medications: 











Acetaminophen [Tylenol  mg (*)] 1,000 mg PO Q8H PRN 06/26/18 [Last Taken 

Unknown]


Ibuprofen [Motrin (*)] 400 mg PO Q4H PRN 06/26/18 [Last Taken 06/26/18]


Ibuprofen/Diphenhydramine HCl [Advil Pm Liqui-Gels] 1 each PO HS PRN 06/26/18 [

Last Taken 06/25/18]


Tamsulosin HCl [Flomax 0.4 MG (*)] 0.4 mg PO DAILY@1830 06/26/18 [Last Taken 06/ 25/18]


Travoprost Z 0.004% [Travatan Z 0.004% (*)] 1 drops RTEYE HS 06/26/18 [Last 

Taken 06/25/18]


amLODIPine BESYLATE [Norvasc 5 mg (*)] 5 mg PO HS 06/26/18 [Last Taken 06/25/18]





Allergies/Adverse Reactions: 








No Known Allergies Allergy (Verified 06/26/18 15:54)


 





Mental status: A&Ox3


Heart exam: regular rate and rhythm


Mallampati Score: Class 2

## 2018-06-29 LAB — PLATELET # BLD: 313 10^3/UL (ref 150–400)

## 2018-06-29 RX ADMIN — ZOLPIDEM TARTRATE PRN MG: 5 TABLET ORAL at 21:51

## 2018-06-29 RX ADMIN — TAMSULOSIN HYDROCHLORIDE SCH MG: 0.4 CAPSULE ORAL at 19:07

## 2018-06-29 RX ADMIN — CEFEPIME SCH MLS: 1 INJECTION, POWDER, FOR SOLUTION INTRAMUSCULAR; INTRAVENOUS at 09:13

## 2018-06-29 RX ADMIN — TRAVOPROST SCH DROP: 0.04 SOLUTION/ DROPS OPHTHALMIC at 21:52

## 2018-06-29 RX ADMIN — CEFEPIME SCH MLS: 1 INJECTION, POWDER, FOR SOLUTION INTRAMUSCULAR; INTRAVENOUS at 21:50

## 2018-06-29 NOTE — HOSPPROG
Hospitalist Progress Note


Assessment/Plan: 





83 yo M admitted w sepsis 2/2 urinary source





sepsis: leukocytosis, fever and source


   septic physiology has resolved





uti: urine growing pan sens psuedomonas


   switched to cefepime


   


bladder mass: plan for biopsy vs resection after clearance of infection





proph: hold lmwh given need for possible procedures





R hydro: perc neph tube today





hyponatremia: give 1 L NS- suspected hypovolemic   





AHRF: atelectasis I suspect





dispo: inpt


Subjective: anxious for dc.  afebrile


Objective: 


 Vital Signs











Temp Pulse Resp BP Pulse Ox


 


 36.6 C   57 L  16   125/67 H  93 


 


 06/29/18 13:14  06/29/18 13:14  06/29/18 13:14  06/29/18 13:14  06/29/18 13:14








 Laboratory Results





 06/29/18 11:20 





 06/29/18 11:20 





 











 06/28/18 06/29/18 06/30/18





 05:59 05:59 05:59


 


Intake Total 894 450 


 


Output Total 775 2550 550


 


Balance 119 -2100 -550








 











PT  16.4 SEC (12.0-15.0)  H  06/28/18  04:28    


 


INR  1.30  (0.83-1.16)  H  06/28/18  04:28    














- Physical Exam


Constitutional: no apparent distress, appears nourished


Eyes: PERRL, anicteric sclera


Ears, Nose, Mouth, Throat: moist mucous membranes, hearing normal


Cardiovascular: regular rate and rhythym, no murmur, rub, or gallop


Respiratory: no respiratory distress, no rales or rhonchi


Gastrointestinal: normoactive bowel sounds, soft, non-tender abdomen


Genitourinary: no bladder fullness, angel in urethra


Skin: warm, normal color


Musculoskeletal: full muscle strength





ICD10 Worksheet


Patient Problems: 


 Problems











Problem Status Onset


 


Fever Acute  


 


Urinary tract infection Acute  


 


Bleeding Active  


 


AMANDA (acute kidney injury) Acute  


 


Bladder malignancy Acute  


 


Obstruction of right ureter Acute

## 2018-06-29 NOTE — ASMTCMCOM
CM Note

 

CM Note                       

Notes:

Pt will likely be ready for DC tomorrow. After meeting with a , pt decided he is interested 


in palliative care and would like to meet with someone to discuss it at his home. faxed referral to 


[facility]. Pt will also continue with [facility] RN at [place]. CM to follow..

 

Date Signed:  06/29/2018 03:50 PM

Electronically Signed By:Sammie Jones LCSW

## 2018-06-29 NOTE — ASMTCMCOM
CM Note

 

CM Note                       

Notes:

Pt ready for DC today. Alerted [facility] for restart of HC RN.

 

Date Signed:  06/29/2018 10:08 AM

Electronically Signed By:Sammie Jones LCSW

## 2018-06-30 VITALS — DIASTOLIC BLOOD PRESSURE: 75 MMHG | SYSTOLIC BLOOD PRESSURE: 152 MMHG

## 2018-06-30 RX ADMIN — CEFEPIME SCH MLS: 1 INJECTION, POWDER, FOR SOLUTION INTRAMUSCULAR; INTRAVENOUS at 09:45

## 2018-06-30 NOTE — ASDISCHSUM
----------------------------------------------

Discharge Information

----------------------------------------------

Plan Status:Home with Home Health                    Medically Cleared to Leave:

Discharge Date:06/30/2018 12:17 PM                    D/C Disposition:Home Health Service

ADT D/C Disposition:Home, Routine, Self-Care         Projected Discharge Date:06/30/2018 11:00 AM

Transportation at D/C:                               Discharge Delay Reason:

Follow-Up Date:06/30/2018 11:00 AM                   Discharge Slot:

Final Diagnosis:

----------------------------------------------

Placement Information

----------------------------------------------

Referral Type:*Home Health Care Services             Referral ID:[id number]

Provider Name:[facility]

Address 1:[address] [address]                  Phone [phone]

Address 2:                                           Fax [phone]

City:[place]                                         Selection Factors:

State:[place]

 

Referral Type:Palliative Care                        Referral ID:[id number]

Provider Name:[facility]

Address 1:[address]                            Phone Number:

Address 2:                                           Fax Number:

[facility]:[place]                                            Selection Factors:

State:[place]

 

----------------------------------------------

Patient Contact Information

----------------------------------------------

Contact Name:SENA                              Relationship:Daughter

Address:[address]                           Home [phone]

                                                     Work Phone:

City:[place]                                           Alternate Phone:

State/Zip Code:[place] [address]                              Email:

----------------------------------------------

Financial Information

----------------------------------------------

Financial Class:Medicare Advantage Plans

Primary Plan Desc:[employer]               Primary Plan Number:[id number]

Secondary Plan Desc:                                 Secondary Plan Number:

 

 

----------------------------------------------

Assessment Information

----------------------------------------------

----------------------------------------------

[facility] CM Progress Note

----------------------------------------------

CM Note

 

CM Note                       

Notes:

Pt is current with [facility] for HC RN. CM will follow for add'l needs.

 

Date Signed:  06/27/2018 10:02 AM

Electronically Signed By:Sammie Jones LCSW

 

 

----------------------------------------------

[facility] CM Progress Note

----------------------------------------------

CM Note

 

CM Note                       

Notes:

Pt ready for DC today. Alerted [facility] for restart of HC RN.

 

Date Signed:  06/29/2018 10:08 AM

Electronically Signed By:Sammie Jones LCSW

 

 

----------------------------------------------

[facility] CM Progress Note

----------------------------------------------

CM Note

 

CM Note                       

Notes:

Pt will likely be ready for DC tomorrow. After meeting with a , pt decided he is interested 


in palliative care and would like to meet with someone to discuss it at his home. faxed referral to 


[facility]. Pt will also continue with [facility] RN at [place]. CM to follow..

 

Date Signed:  06/29/2018 03:50 PM

Electronically Signed By:Sammie Jones LCSW

 

 

----------------------------------------------

[facility] CM Progress Note

----------------------------------------------

CM Note

 

CM Note                       

Notes:

Pt ready for DC today. [facility] and Dina alerted. 

 

Date Signed:  06/30/2018 11:03 AM

Electronically Signed By:Sammie Jones LCSW

 

 

----------------------------------------------

Intervention Information

----------------------------------------------

## 2018-06-30 NOTE — ASMTCMCOM
CM Note

 

CM Note                       

Notes:

Pt ready for DC today. [facility] and Dina alerted. 

 

Date Signed:  06/30/2018 11:03 AM

Electronically Signed By:Sammie Jones LCSW

## 2018-06-30 NOTE — PDIAF
- Diagnosis


Diagnosis: urinary obstruction


Code Status: Do Not Resuscitate





- Medication Management


Discharge Medications: 


 Medications to Continue on Transfer





Acetaminophen [Tylenol  mg (*)] 1,000 mg PO Q8H PRN 06/26/18 [Last Taken 

Unknown]


Ibuprofen [Motrin (*)] 400 mg PO Q4H PRN 06/26/18 [Last Taken 06/26/18]


Ibuprofen/Diphenhydramine HCl [Advil Pm Liqui-Gels] 1 each PO HS PRN 06/26/18 [

Last Taken 06/25/18]


Tamsulosin HCl [Flomax 0.4 MG (*)] 0.4 mg PO DAILY@1830 06/26/18 [Last Taken 06/ 25/18]


Travoprost Z 0.004% [Travatan Z 0.004% (*)] 1 drops RTEYE HS 06/26/18 [Last 

Taken 06/25/18]


amLODIPine BESYLATE [Norvasc 5 mg (*)] 5 mg PO HS 06/26/18 [Last Taken 06/25/18]


levOFLOXACIN [Levofloxacin] 750 mg PO DAILY #10 tablet 06/30/18 [Last Taken 

Unknown]








Discharge Medications: Refer to the Discharge Home Medication list for PRN 

reason.





- Orders


Services needed: Registered Nurse





- Follow Up Care


Current Providers and Referrals: 


ALCON SCHMIDT [Other] - As per Instructions


Primo Diamond MD [Medical Doctor] -

## 2018-06-30 NOTE — GDS
[f rep st]



                                                             DISCHARGE SUMMARY





DISCHARGE DIAGNOSES:  

1.  Sepsis of urinary source.

2.  Complicated urinary tract infection with pansensitive Pseudomonas.

3.  Bladder mass.

4.  Right hydronephrosis, status post percutaneous nephrostomy tube.

5.  Hearing loss.  

6.  Macular degeneration.  



Please see admission history and physical by Dr. Contreras Kirkland.  The patient presented with fever and ch
ills.  He saw Dr. Diamond just prior to admission, who performed a cystoscopy.  He has had irritation o
f his bladder, as well as the previously known bladder mass.  He had a Maxwell changed at that time.  H
e was initiated on ceftriaxone.  Urine grew back Pseudomonas.  He was started on cefepime.  It was no
toby to be pansensitive, including fluoroquinolone sensitive.  The patient had a marked leukocytosis w
hile here of 20, peaking at 28.  It has trended down to 11.  He has been afebrile.  He has negative b
lood cultures.  He is discharged home with outpatient followup.  He currently has a Maxwell catheter in
 with plans for urology followup for transient urethral resection of a bladder tumor.  He has a percu
taneous nephrostomy tube in as well.  The patient demonstrated understanding of the plan.





Job #:  [id number]

## 2018-06-30 NOTE — HOSPPROG
Hospitalist Progress Note


Assessment/Plan: 





83 yo M admitted w sepsis 2/2 urinary source





sepsis: leukocytosis, fever and source


   septic physiology has resolved





uti: urine growing pan sens pseudomonas


   switched to cefepime


   


bladder mass: plan for biopsy vs resection after clearance of infection





proph: hold lmwh given need for possible procedures





R hydro: perc neph tube today





hyponatremia: give 1 L NS- suspected hypovolemic   





AHRF: atelectasis I suspect





dispo: home today


   > 30 minutes


   see dc summary


Subjective: anxious for dc.  afebrile


Objective: 


 Vital Signs











Temp Pulse Resp BP Pulse Ox


 


 36.6 C   62   16   152/75 H  94 


 


 06/30/18 07:39  06/30/18 07:39  06/30/18 07:39  06/30/18 07:39  06/30/18 07:39








 Laboratory Results





 06/29/18 11:20 





 06/29/18 11:20 





 











 06/29/18 06/30/18 07/01/18





 05:59 05:59 05:59


 


Intake Total 450 1650 435


 


Output Total 2550 2475 375


 


Balance -2100 -825 60








 











PT  16.4 SEC (12.0-15.0)  H  06/28/18  04:28    


 


INR  1.30  (0.83-1.16)  H  06/28/18  04:28    














- Physical Exam


Constitutional: no apparent distress, appears nourished


Eyes: PERRL, anicteric sclera


Ears, Nose, Mouth, Throat: moist mucous membranes, hearing normal


Cardiovascular: regular rate and rhythym, no murmur, rub, or gallop


Respiratory: no respiratory distress, no rales or rhonchi


Gastrointestinal: normoactive bowel sounds, soft, non-tender abdomen


Genitourinary: no bladder fullness, angel in urethra


Skin: warm, normal color


Musculoskeletal: full muscle strength, no muscle tenderness


Neurologic: AAOx3


Psychiatric: interacting appropriately


Lymph, Heme, Immunologic: no cervical LAD





ICD10 Worksheet


Patient Problems: 


 Problems











Problem Status Onset


 


Fever Acute  


 


Urinary tract infection Acute  


 


Bleeding Active  


 


AMANDA (acute kidney injury) Acute  


 


Bladder malignancy Acute  


 


Obstruction of right ureter Acute

## 2018-07-02 ENCOUNTER — HOSPITAL ENCOUNTER (EMERGENCY)
Dept: HOSPITAL 80 - FED | Age: 83
Discharge: HOME | End: 2018-07-02
Payer: COMMERCIAL

## 2018-07-02 VITALS — SYSTOLIC BLOOD PRESSURE: 168 MMHG | DIASTOLIC BLOOD PRESSURE: 88 MMHG
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
## 2018-07-02 DIAGNOSIS — C67.9: ICD-10-CM

## 2018-07-02 DIAGNOSIS — T83.091A: Primary | ICD-10-CM

## 2018-07-02 NOTE — EDPHY
H & P


Stated Complaint: hx bladder tunors/angel placed 6/26 now hematuria and blocked


Time Seen by Provider: 07/02/18 17:20


HPI/ROS: 





CHIEF COMPLAINT:  Angel catheter problem





HISTORY OF PRESENT ILLNESS:  The patient is an 84-year-old man with a known 

bladder mass who was recently hospitalized for urinary tract infection and has 

a right-sided nephrostomy tubes well as a Angel catheter.  His family states 

that about to 3 hours ago his Angel catheter stopped draining urine.  They 

called the urologist office who told him to come here.  On the way here started 

draining again but is now bloody.  No leaking from around.  No abdominal pain, 

no vomiting.  No fever.








REVIEW OF SYSTEMS:


Constitutional:  denies: chills, fever, recent illness, recent injury


EENTM: denies: blurred vision, double vision, nose congestion


Respiratory: denies: cough, shortness of breath


Cardiac: denies: chest pain, irregular heart rate, lightheadedness, palpitations


Gastrointestinal/Abdominal: denies: abdominal pain, diarrhea, nausea, vomiting, 

blood streaked stools


Genitourinary:  See HPI


Musculoskeletal: denies: joint pain, muscle pain


Skin: denies: lesions, rash, jaundice, bruising


Neurological: denies: headache, numbness, paresthesia, tingling, dizziness, 

weakness


Hematologic/Lymphatic: denies: blood clots, easy bleeding, easy bruising


Immunologic/allergic: denies: HIV/AIDS, transplant








EXAM:


GENERAL:  Well-appearing, well-nourished and in no acute distress.


HEAD:  Atraumatic, normocephalic.


EYES:  Pupils equal round and reactive to light, extraocular movements intact, 

sclera anicteric, conjunctiva are normal.


ENT:  TMs normal, nares patent, oropharynx clear without exudates.  Moist 

mucous membranes.


NECK:  Normal range of motion, supple without lymphadenopathy or JVD.


LUNGS:  Breath sounds clear to auscultation bilaterally and equal.  No wheezes 

rales or rhonchi.


HEART:  Regular rate and rhythm without murmurs, rubs or gallops.


ABDOMEN:  Soft, nontender, normoactive bowel sounds.  No guarding, no rebound.  

No masses appreciated.  Angel catheter in place, no leaking from around tip.  

Fruit punch colored urine.  No visible clots.


BACK:  Nephrostomy tube in place, draining yellow fluid, nonbloody No CVA 

tenderness, no spinal tenderness, step-offs or deformities


EXTREMITIES:  Normal range of motion, no pitting or edema.  No clubbing or 

cyanosis.


NEUROLOGICAL:  Cranial nerves II through XII grossly intact.  Normal speech, 

normal gait.  5/5 strength, normal movement in all extremities, normal sensation


PSYCH:  Normal mood, normal affect.


SKIN:  Warm, dry, normal turgor, no visible rashes or lesions.








Source: Patient


Exam Limitations: No limitations





- Personal History


Current Tetanus Diphtheria and Acellular Pertussis (TDAP): Yes


Tetanus Vaccine Date: 9/11





- Medical/Surgical History


Hx Asthma: No


Hx Chronic Respiratory Disease: No


Hx Diabetes: No


Hx Cardiac Disease: No


Hx Renal Disease: Yes


Hx Cirrhosis: No


Hx Alcoholism: No


Hx HIV/AIDS: No


Hx Splenectomy or Spleen Trauma: No


Other PMH: TONSIL, ADENOIDS, EYE , macular degeneration, STRAIGHTENING, 

diverticulitis, broken pelvis, right broken arm





- Family History


Significant Family History: No pertinent family hx





- Social History


Smoking Status: Never smoked


Alcohol Use: None


Constitutional: 


 Initial Vital Signs











Temperature (C)  36.4 C   07/02/18 17:13


 


Heart Rate  58 L  07/02/18 17:13


 


Respiratory Rate  18   07/02/18 17:13


 


Blood Pressure  168/78 H  07/02/18 17:13


 


O2 Sat (%)  96   07/02/18 17:13








 











O2 Delivery Mode               Room Air














Allergies/Adverse Reactions: 


 





No Known Allergies Allergy (Verified 07/02/18 17:13)


 








Home Medications: 














 Medication  Instructions  Recorded


 


Acetaminophen [Tylenol  mg 1,000 mg PO Q8H PRN 06/26/18





(*)]  


 


Ibuprofen [Motrin (*)] 400 mg PO Q4H PRN 06/26/18


 


Ibuprofen/Diphenhydramine HCl 1 each PO HS PRN 06/26/18





[Advil Pm Liqui-Gels]  


 


Tamsulosin HCl [Flomax 0.4 MG (*)] 0.4 mg PO DAILY@1830 06/26/18


 


Travoprost Z 0.004% [Travatan Z 1 drops RTEYE HS 06/26/18





0.004% (*)]  


 


amLODIPine BESYLATE [Norvasc 5 mg 5 mg PO HS 06/26/18





(*)]  


 


levOFLOXACIN [Levofloxacin] 750 mg PO DAILY #10 tablet 06/30/18














Medical Decision Making


ED Course/Re-evaluation: 





6:00 p.m. the patient's Angel catheter was flushed easily with several L of 

saline.  He tolerated this well.  Several small clots were obtained.  The urine 

cleared.  Family is reassured and they feel eager to go home.  They declined 

further testing or workup.  They would like to try this procedure at home if 

needed.


Differential Diagnosis: 





Partial list of the Differential diagnosis considered include but were not 

limited to;  Angel catheter obstruction, hematuria, bladder mass and although 

unlikely based on the history and physical exam, I also considered infection, 

DVT, trauma.  I discussed these differential diagnoses and the plan with the 

patient as well as the usual and expected course.  The patient understands that 

the diagnosis is provisional and that in medicine we are not always correct and 

that further workup is often warranted.  Usual and customary warnings were 

given.  All of the patient's questions were answered.  The patient was 

instructed to return to the emergency department should the symptoms at all 

worsen or return, otherwise to followup with the physician as we discussed.





Departure





- Departure


Disposition: Home, Routine, Self-Care


Clinical Impression: 


Bladder malignancy


Qualifiers:


 Bladder location: unspecified site Qualified Code(s): C67.9 - Malignant 

neoplasm of bladder, unspecified





Angel catheter problem


Qualifiers:


 Encounter type: initial encounter Qualified Code(s): T83.9XXA - Unspecified 

complication of genitourinary prosthetic device, implant and graft, initial 

encounter





Condition: Fair


Instructions:  Angel Catheter Placement and Care (ED)


Referrals: 


NONE *PRIMARY CARE P,. [Primary Care Provider] - As per Instructions


Primo Diamond MD [Medical Doctor] - As per Instructions

## 2018-07-11 NOTE — GHP
[f 
rep st]



                                                       PREOP HISTORY AND 
PHYSICAL





ADMISSION DIAGNOSIS:  Bladder cancer.



HISTORY OF PRESENT ILLNESS:  This is an 84-year-old gentleman who has had 
bladder cancer.  He has been complicated having a right hydronephrosis with 
nephrostomy tube placement and urinary retention with urosepsis.  At the 
present time, he is admitted for transurethral resection of a large bladder 
tumor. 



Indications, complications have been discussed.  Written and verbal consent was 
obtained.



PAST MEDICAL HISTORY:  He has had BPH, bladder cancer, glaucoma, hypertension, 
hydronephrosis.  He has also had a renal mass.



PAST SURGICAL HISTORY:  Tonsillectomy and eye surgery.



MEDICATIONS:  Advil, Ambien, amlodipine, Norvasc.



ALLERGIES:  No known drug allergies.



FAMILY HISTORY:  Positive for prostate cancer, hypertension, heart disease.



SOCIAL HISTORY:  Non drinker.  .  Nonsmoker.



REVIEW OF SYSTEMS:  Negative cardiac, respiratory, GI and endocrine.



PHYSICAL EXAMINATION:  VITAL SIGNS:  Stable.  CHEST:  Clear.  HEART:  Regular 
rate and rhythm.  ABDOMEN:  Normal.  No organomegaly, rebound or guarding. 
EXTREMITIES:  Lower extremities are normal. 



He is admitted for the above procedure.





Job #:  [id number]

MTDD

## 2018-07-12 ENCOUNTER — HOSPITAL ENCOUNTER (OUTPATIENT)
Dept: HOSPITAL 80 - F1N | Age: 83
Setting detail: OBSERVATION
LOS: 1 days | Discharge: HOME | End: 2018-07-13
Attending: SPECIALIST | Admitting: SPECIALIST
Payer: COMMERCIAL

## 2018-07-12 DIAGNOSIS — R33.9: ICD-10-CM

## 2018-07-12 DIAGNOSIS — Z80.42: ICD-10-CM

## 2018-07-12 DIAGNOSIS — C67.9: Primary | ICD-10-CM

## 2018-07-12 DIAGNOSIS — I10: ICD-10-CM

## 2018-07-12 DIAGNOSIS — Z66: ICD-10-CM

## 2018-07-12 DIAGNOSIS — Z82.49: ICD-10-CM

## 2018-07-12 DIAGNOSIS — Z85.49: ICD-10-CM

## 2018-07-12 DIAGNOSIS — R93.429: ICD-10-CM

## 2018-07-12 DIAGNOSIS — N13.0: ICD-10-CM

## 2018-07-12 DIAGNOSIS — N40.3: ICD-10-CM

## 2018-07-12 PROCEDURE — 0TBB8ZX EXCISION OF BLADDER, VIA NATURAL OR ARTIFICIAL OPENING ENDOSCOPIC, DIAGNOSTIC: ICD-10-PCS | Performed by: SPECIALIST

## 2018-07-12 PROCEDURE — G0378 HOSPITAL OBSERVATION PER HR: HCPCS

## 2018-07-12 PROCEDURE — 52234 CYSTOSCOPY AND TREATMENT: CPT

## 2018-07-12 RX ADMIN — HYDROCODONE BITARTRATE AND ACETAMINOPHEN PRN TAB: 5; 325 TABLET ORAL at 16:22

## 2018-07-12 NOTE — GOP
[f rep st]



                                                                OPERATIVE REPORT





DATE OF OPERATION:  07/12/2018



SURGEON:  Primo Diamond MD



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
tatic hypertrophy with urinary retention.



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
static hypertrophy with urinary retention.



PROCEDURE PERFORMED:  Transurethral resection of a large bladder tumor.



FINDINGS:  





DESCRIPTION OF PROCEDURE:  Underwent general anesthesia and prepped and draped in a normal sterile fa
shion.  After appropriate time-out, I passed the scope into the bladder.  He had a very large obstruc
ting prostate that gave some limitation on the maneuverability of the scope.  At that point, I identi
fied the left-sided bladder tumor and started resecting from medial to lateral and getting into near 
the base of the lesion.  He had excessive arterial blood flow to this, and eventually turned the elec
trocautery bipolar up to 160 and was able to get hemostasis.  With every small manipulation of the sc
ope, this created further bleeding and hemorrhage, and the impression was he had invasive bladder can
cer.  I did not feel that I could resect the entire lesion and that it would be of any benefit to him
 to do that at the present time, and I felt that the risk of bleeding and need for extensive interven
tion may be high just because of the size of the arterial blood flow into the base of the tumor, and 
I had not gone across the entire base.  



So at that point, I elliked his bladder free of all chips and clots and reinvestigated the base and t
here were no significant bleeding sites.  There was some oozing from the prostate at the base of the 
prostate and cauterized that.  At that point, placed a 24 three-way catheter with a 30 cc balloon and
 irrigated clear.  Felt there was no instability of the patient and felt there was no perforation of 
the bladder and because of the size of the tumor, the hypervascularity, and the age of it, felt that 
the tissue diagnosis had been obtained.  To me, it has grossly invasive cancer based on physical exam
 and CAT scan, and intervention would not be surgical and considering post bladder radiation in tryin
g to control the growth of the tumor.  Will discuss that with the family.





Job #:  [id number]

## 2018-07-12 NOTE — POSTOPPROG
Post Op Note


Date of Operation: 07/12/18


Surgeon: Primo Diamond


Anesthesia: GET(General Endotracheal)


Pre-op Diagnosis: bladder cancer


Post-op Diagnosis: same


Indication: same


Procedure: turbt


Findings: bladder cancer


Inf/Abcess present in the surg proc area at time of surgery?: No


EBL: 


Complications: 





none--dictated--discussed with daughter


Specimen(s): 





sent

## 2018-07-12 NOTE — ASMTCMCOM
CM Note

 

CM Note                       

Notes:

Chart reviewed. Patient underwent urological surgery where it was determined his cancer is very 

invasive and no surgical cure available. Daughter approached CM about SNF and involvement of 

hospice if appropriate. Discussed facilities in [place] and she is comfortable and familiar with 

[facility]. Referral placed via allscripts. Will also reach out to [facility] who 

patient is current with. CM to follow.



Plan: Likely SNF with [facility].

 

Date Signed:  07/12/2018 02:30 PM

Electronically Signed By:Porsha Montana RN

## 2018-07-12 NOTE — PDANEPAE
ANE History of Present Illness


84 year old male for TURBT.





ANE Past Medical History





- Cardiovascular History


Hx Hypertension: Yes


Hx Arrhythmias: No


Hx Chest Pain: No


Hx Coronary Artery / Peripheral Vascular Disease: No


Hx CHF / Valvular Disease: No


Hx Palpitations: No





- Pulmonary History


Hx COPD: No


Hx Asthma/Reactive Airway Disease: No


Hx Recent Upper Respiratory Infection: No


Hx Oxygen in Use at Home: No


Hx Sleep Apnea: No





- Neurologic History


Hx Cerebrovascular Accident: No


Hx Seizures: No


Hx Dementia: No





- Endocrine History


Hx Diabetes: No


Obesity: no





- Renal History


Renal History Comment: Patient has renal mass





- Cancer History


Hx Cancer: Yes


Cancer History Comment: Prostate Cancer.  Bladder Cancer





- Congenital Disorder History


Hx Congenital Disorders: No





- GI History


GERD: no


Hx Gastrointestinal Disorders: No





ANE Review of Systems


Review of systems is: negative


Review of Systems: 








- Exercise capacity


Exercise capacity: <4 METS





ANE Patient History





- Allergies


Allergies/Adverse Reactions: 








No Known Allergies Allergy (Verified 07/12/18 07:54)


 








- Home Medications


Home medications: home medication list seen and reviewed


Home Medications: 








Acetaminophen [Tylenol  mg (*)] 1,000 mg PO Q8H PRN 06/26/18 [Last Taken 

06/28/18]


Ibuprofen [Motrin (*)] 400 mg PO Q4H PRN 06/26/18 [Last Taken 07/03/18]


Ibuprofen/Diphenhydramine HCl [Advil Pm Liqui-Gels] 1 each PO HS PRN 06/26/18 [

Last Taken 07/04/18]


Tamsulosin HCl [Flomax 0.4 MG (*)] 0.4 mg PO DAILY@1830 06/26/18 [Last Taken 07/ 06/18]


Travoprost Z 0.004% [Travatan Z 0.004% (*)] 1 drops RTEYE HS 06/26/18 [Last 

Taken 07/11/18]


amLODIPine BESYLATE [Norvasc 5 mg (*)] 5 mg PO HS 06/26/18 [Last Taken 07/11/18 

19:00]








- NPO status


NPO Status: no food or drink >8 hours





- Anes Hx


Anes Hx: no prior problems





- Smoking Hx


Smoking Status: Never smoked


Marijuana use: No





- Alcohol Use


Alcohol Use: Rarely





- Family Anes Hx


Family Anes Hx: neg - N/A





ANE Labs/Vital Signs





- Vital Signs


Vital Signs: reviewed preoperatively; see RN documention for details


Height: 170.18 cm


Weight: 68.039 kg





ANE Physical Exam





- Airway


Neck exam: FROM


Mallampati Score: Class 2


Mouth image: 


  __________________________














  __________________________





 1 - Bonded








- Pulmonary


Pulmonary: no respiratory distress





- Cardiovascular


Cardiovascular: regular rate and rhythym





- ASA Status


ASA Status: III





ANE Anesthesia Plan


Anesthesia Plan: general endotracheal anesthesia


Total IV Anesthesia: No

## 2018-07-13 VITALS — SYSTOLIC BLOOD PRESSURE: 163 MMHG | DIASTOLIC BLOOD PRESSURE: 75 MMHG

## 2018-07-13 LAB — PLATELET # BLD: 204 10^3/UL (ref 150–400)

## 2018-07-13 RX ADMIN — HYDROCODONE BITARTRATE AND ACETAMINOPHEN PRN TAB: 5; 325 TABLET ORAL at 02:03

## 2018-07-13 RX ADMIN — ZOLPIDEM TARTRATE SCH: 5 TABLET ORAL at 03:20

## 2018-07-13 RX ADMIN — ZOLPIDEM TARTRATE SCH MG: 5 TABLET ORAL at 03:51

## 2018-07-13 NOTE — ASMTCMCOM
CM Note

 

CM Note                       

Notes:

Chart reviewed. patient has been medically cleared per urology for discharge to home. He is up 

ambulating in the halls and states he "Is great". Spoke with daughter Bethany who thinks looking for 

SNF was premature at this point and she is willing to take him home. Will resume HHC with [facility] on 

Monday. No other needs anticipated at this point in time. CM available should other needs arise.



Plan: Home with family and HHC

 

Date Signed:  07/13/2018 11:31 AM

Electronically Signed By:Porsha Montana RN

## 2018-07-13 NOTE — SOAPPROG
SOAP Progress Note


Assessment/Plan: 


Assessment:  Bladder malignancy   Acute  POD # 1 doing well, plan for pallative 

irradiation








Plan: as noted








07/13/18 09:16





Subjective: 





doing well


Objective: 





 Vital Signs











Temp Pulse Resp BP Pulse Ox


 


 36.7 C   57 L  16   133/70 H  95 


 


 07/13/18 04:22  07/13/18 04:22  07/13/18 04:22  07/13/18 04:22  07/13/18 04:22








 Laboratory Results





 07/13/18 03:56 





 07/13/18 03:56 





 











 07/12/18 07/13/18 07/14/18





 05:59 05:59 05:59


 


Intake Total  1680 


 


Output Total  -2650 2100


 


Balance  4330 -2100














Physical Exam





- Physical Exam


General Appearance: alert


Respiratory: No respiratory distress


Abdomen: soft


Back: No CVA tenderness


Extremities: No calf tenderness


Neuro/Psych: alert, oriented x 3





ICD10 Worksheet


Patient Problems: 


 Problems











Problem Status Onset


 


Bleeding Active  


 


AMANDA (acute kidney injury) Acute  


 


Bladder malignancy Acute  


 


Fever Acute  


 


Obstruction of right ureter Acute  


 


Urinary tract infection Acute

## 2018-07-13 NOTE — GCON
[f 
rep st]



                                                                    CONSULTATION





RADIATION ONCOLOGY CONSULTATION



DATE OF CONSULTATION:  07/13/2018



REFERRING PHYSICIAN:  Primo Diamond MD





REASON FOR CONSULTATION:  Unresectable bladder cancer, role for palliative 
radiation.



IDENTIFICATION:  The patient is an 84-year-old gentleman with some medical 
comorbidities, but otherwise in reasonable health, who was found to have a 
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 
at TURBT yesterday by Dr. Matthew Diamond.  We do not have the final pathology, but 
this is like a very large vascularized tumor that was unable to be safely 
resected.  I am being consulted regarding the role of palliative radiation for 
this tumor.



HISTORY OF PRESENT ILLNESS:  The patient reports a several month history of 
difficulty urinating in the form of bladder spasms, inability to empty and 
start his stream.  In June of 2018, he presented to the [facility] Emergency Department for further evaluation.  CT scan of the abdomen 
and pelvis revealed high-grade obstruction of the right renal collecting system
, secondary to bladder wall mass with involvement of the distal right ureter.  
Associated pelvic lymphadenopathy.  Urology consultation was recommended.  
Bilateral renal cortical and parapelvic nodules that are statistically most 
likely to represent cyst.  Solid lesions, including malignancy not excluded by 
noncontrast imaging.  Extensive diverticulosis.  Retained stool within the 
cecum and right hemicolon resultant mild dilatation.  CT of the chest revealed 
no evidence of metastatic disease in the chest.  He had a CT cystogram that 
revealed right-sided bladder mass obstructing the ureterovesical junction and 
continues with an enlarged nodular prostate.  Differential would include 
extension of prostate carcinoma versus bladder carcinoma.  It was an enlarged 
right pelvic lymph node.  Large right inguinal hernia containing a loop of 
small bowel.  He had an MRI of the abdomen that revealed right nephromegaly 
with right renal hilar inflammation and moderate to severe right hydronephrosis 
with secondary features likely reflecting calyceal rupture in a patient with a 
bladder mass obstructing the right ureterovesical junction.  Because IV 
contrast could not be administered an occult right hilar mass would be 
difficult to exclude in this challenging to better characterize the renal 
cortical lesions.  





Recently, the patient has continued to have urinary issues and difficulty 
voiding his urine.  He developed sudden onset fever and confusion and was 
admitted to [facility] for further workup and management.  He 
was found to have a likely urinary tract infection and started on antibiotics.  
A urinary catheter was placed and he was set up for attempt at TURBT of the 
bladder tumor.  This was attempted on July 12, 2018, by Dr. Anatoliy Diamond.  
Intraoperatively, he had a very large obstructing prostate that gave some 
limitation to the exam.  He identified a left-sided bladder tumor and started 
resecting from medial to lateral and get into the near the base of the lesion.  
There was excessive arterial blood flow to the tumor and he was able to obtain 
hemostasis.  Any manipulation of the scope caused further bleeding and he 
elected to abort the procedure and not proceed with further resection of that 
tumor. 



After which he did place a right nephrostomy tube to drain urine and he 
currently has a urethral catheter in place.



INTERVAL HISTORY:  Since the attempt at TURBT, the patient overall feels 
relatively well.  He denies any bloody discharge from the catheter.  Denies any 
pain.  He does denies any weakness or lightheadedness. 



Otherwise, the patient prior to all this did have a relatively good performance 
status of ECOG of 1-2.  He has declining vision and other comorbidities that 
have really impacted the quality of his life.  At this point, he lives with his 
daughter, Bethany, who is on the phone discussing his care with us this morning 
in the hospital.



REVIEW OF SYSTEMS:  A complete review of systems is obtained and is negative 
except as mentioned above.



PAST MEDICAL HISTORY:  

1.  BPH. 

2.  Bladder cancer.

3.  Glaucoma.  

4.  Hypertension.

5.  Hydronephrosis.



PAST SURGICAL HISTORY:  Tonsillectomy, eye surgery, attempted TURBT of the 
bladder tumor.



FAMILY HISTORY:  Prostate cancer, hypertension and cardiovascular disease.



SOCIAL HISTORY:  The patient lives with his daughter, Bethany, who is the durable 
power of  in [place].  She is very involved with his care and on the 
phone with us today during the discussion.  The patient was a contractor and 
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  He is , was a nonsmoker, 
nondrinker.



MEDICATIONS:  Tylenol, Vicodin, amlodipine, belladonna, alkaloids for bladder 
spasm, Colace, Zofran, Travatan, Ambien.



ALLERGIES:  No known drug allergies.



PHYSICAL EXAM:  HOSPITAL VITAL SIGNS:  Blood pressure 163/75, pulse is 56, 
respiratory rate is 18, oxygen sat is 96%, temperature is 36.7.  GENERAL:  The 
patient is a well-appearing pleasant 84-year-old gentleman who is in no acute 
distress.  Alert and orient x3.  He is lying comfortably in his hospital bed.  
CARDIOVASCULAR:  Regular rate and rhythm.  Normal S1, S2.  No murmurs, rubs, or 
gallops.  LUNGS:  Clear to auscultation bilaterally.  No wheezes, crackles, or 
rubs.  ABDOMEN:  Soft, nontender, nondistended.  Bowel sounds are present.  No 
masses or lymphadenopathy palpated.  GENITOURINARY:  Right nephrostomy tube is 
placed and the site appears clean, dry, and intact.  Urethral catheter in place 
without any signs of infection. 



Rest of exam is deferred given patient's condition.



IMAGING:  Please see HPI above for most recent imaging.



PATHOLOGY:  Pathology from the recent TURBT attempt is pending.



ASSESSMENT AND PLAN:  The patient is an 84-year-old gentleman with muscle 
invasive bladder cancer, stage unknown, however, likely clinical T4 N2 M0, 
stage IIIB of the urinary bladder, status post attempted TURBT yesterday which 
was aborted because of tumor vascularity and risk of bleeding.  I am now 
evaluating him regarding a role of palliative radiation.



DISCUSSION:  I had a long discussion with the patient and his daughter, Bethany 
today in his hospital room regarding diagnosis and management of stage IIIB 
bladder cancer.  I reviewed with him the clinical course leading up until his 
recent hospitalization, as well as his condition since the attempted TURBT.  
Overall he did well with that procedure and seems to have recovered well from 
the anesthesia and has not suffered any side effects so far.  He does have a 
right nephrostomy tube and urethral catheter and the urine seems to be flowing 
quite fine.  His anticipated discharge is this morning actually per Dr. Diamond's 
recommendation.  I reviewed the prior imaging with the patient and Bethany today 
which shows at least a clinical T4 N1 cancer which is quite large and extensive 
tumor in the right side of the bladder, which seems to have been obstructing 
the urethra and does seem to be invading into the prostate, although it is 
difficult to tell.  Surprisingly, the patient was not having terrible side 
effects until about last month, but does seem like the right nephrostomy tube 
and urethral catheter will be permanent to allow him to urinate. 



I discussed the broad treatment recommendations for muscle invasive bladder 
cancer, which would be either a radical cystectomy or definitive chemo RT with 
an attempt at preserving the bladder.  The patient would likely not tolerate a 
radical cystectomy or chemotherapy and those options are therefore not the best 
for him.  The only treatment option would be a palliative course of radiation.  
In lieu of that, the patient could proceed with strictly a palliative or 
hospice care approach as his life expectancy for this is anywhere from probably 
6-8 months.  I explained  that palliative care and hospice is certainly a 
reasonable situation in order to manage any side effects or symptoms that may 
develop from this tumor.  I explained that radiation would have a small chance 
at controlling this cancer and possibly prevent a catastrophic bleeding episode 
since his tumor is so vascularized.  My hope would be that the treatment would 
cytoreduce his tumor and stabilize it for a short period of time, but I 
explained that even with radiation, his tumor would grow and progress at some 
point.  I would recommend 5 fractions of palliative radiation to start next 
week if he is open to that.  I think the side effect profile is fairly minimal 
and would include some fatigue, skin irritation, bladder irritation or spasms, 
rectal irritation which can manifest as loose stools, frequency and urgency 
with bowel movements.  There is a low risk of any long-term damage from the 
radiation. 



After discussion today, the patient would like to attempt 5 fractions of 
palliative radiation to start next week.  He is anticipating a discharge this 
morning and after that, we will see him in our clinic to do a CT scan 
simulation and to start treatment next week.  The patient gave me verbal and 
written consent to proceed with radiation planning and delivery.  Bethany, who 
has durable power-of- over the phone, gave me a verbal consent to 
proceed as well. 



I exchanged contact information as well as educational information about 
radiation with the patient today and I encouraged him to discuss with me in the 
future if he has any questions or concerns.





Job #:  [id number]

MTDD

## 2018-07-13 NOTE — ASMTLACE
LACE

 

Length of stay for            Answers:  1 day                                 

current admission                                                             

Comorbidities - select        Answers:  Any tumor (including                  

all that apply                          lymphoma or leukemia)                 

# of Emergency department     Answers:  1-2                                   

visits in the last 6                                                          

months                                                                        

Score: 4

 

Date Signed:  07/13/2018 11:24 AM

Electronically Signed By:Porsha Montana RN

## 2022-05-04 NOTE — GDS
[f rep st]



                                                             DISCHARGE SUMMARY





ADMISSION DIAGNOSIS:  Bladder cancer.



DISCHARGE DIAGNOSIS:  Bladder cancer.



PROCEDURE DURING HOSPITALIZATION:  TURBT.



HOSPITAL COURSE:  The gentleman was an a.m. admission and had a TURBT.  This was a hypervascular lesi
on and biopsy was sent.  I elected not to try to resect the entire lesion because of the highly vascu
lar nature of it and felt that tissue confirmation would be adequate to progress on with potential pa
lliative radiation, and I have asked Radiation Oncology to see him for consultation on that.  He will
 be discharged home with a catheter in place and have followup with me in a month for catheter change
 and palliative radiation is planned per that provider's documents.





Job #:  [id number]
If you are a smoker, it is important for your health to stop smoking. Please be aware that second hand smoke is also harmful.